# Patient Record
Sex: FEMALE | Race: WHITE | Employment: OTHER | URBAN - METROPOLITAN AREA
[De-identification: names, ages, dates, MRNs, and addresses within clinical notes are randomized per-mention and may not be internally consistent; named-entity substitution may affect disease eponyms.]

---

## 2017-03-05 RX ORDER — AMOXICILLIN 875 MG/1
TABLET, COATED ORAL
Qty: 20 TABLET | Refills: 0 | OUTPATIENT
Start: 2017-03-05

## 2017-03-06 ENCOUNTER — TELEPHONE (OUTPATIENT)
Dept: INTERNAL MEDICINE CLINIC | Facility: CLINIC | Age: 57
End: 2017-03-06

## 2017-03-06 NOTE — TELEPHONE ENCOUNTER
Pt calling regarding having an abscess and is requesting  amoxicillin 875 MG Oral Tab so she can get dental work done. .. please advise

## 2017-03-07 NOTE — TELEPHONE ENCOUNTER
Pt is returning call. Please advise.        Routed to [facility] in error , re-route to IM

## 2017-03-07 NOTE — TELEPHONE ENCOUNTER
Advised patient of Dr. Ben Mckinley note. Patient stated that she does not have a dentist currently. Son is trying to get patient an appointment with a dental specialist in [place] to put her under to get all her teeth removed.  Patient needs antibiotic to tr

## 2017-03-07 NOTE — TELEPHONE ENCOUNTER
This is a dental issue/problem. He needs to see his dentist and get treatment. Call the patient and relay the information.  Thanks

## 2017-03-08 NOTE — TELEPHONE ENCOUNTER
Attempted to follow up with pt to advise IC/ED as we do not yet have any orders from Dr. Cade Mendes, however unable to leave a message on her phone. Rerouted to Dr. Cade Mendes.

## 2017-03-08 NOTE — TELEPHONE ENCOUNTER
Patient calling and would like an antibiotic. The patient stated does not have a dentist and has teeth issues. Also has a swollen lymph node in the back of he neck. Does not understand why she cannot get an antibiotic.     Instructed if she feels that g

## 2017-03-09 NOTE — TELEPHONE ENCOUNTER
The patient needs to see a dentist or a physician she does not have a dentist. for this. If she lives in a different state she needs to get a local physician for her medical care.   We cannot treat the patient in a different state without examining the pat

## 2017-08-02 ENCOUNTER — TELEPHONE (OUTPATIENT)
Dept: INTERNAL MEDICINE CLINIC | Facility: CLINIC | Age: 57
End: 2017-08-02

## 2017-08-11 NOTE — TELEPHONE ENCOUNTER
I review the patient's chart and the patient does not have chronic medical conditions. He is not obtaining prescription medication. I do not know why the patient want to be checked for? Is it this is common for annual physical? Please check.   Otherwise

## 2018-04-11 ENCOUNTER — TELEPHONE (OUTPATIENT)
Dept: INTERNAL MEDICINE CLINIC | Facility: CLINIC | Age: 58
End: 2018-04-11

## 2018-06-11 ENCOUNTER — OFFICE VISIT (OUTPATIENT)
Dept: INTERNAL MEDICINE CLINIC | Facility: CLINIC | Age: 58
End: 2018-06-11

## 2018-06-11 VITALS — SYSTOLIC BLOOD PRESSURE: 130 MMHG | DIASTOLIC BLOOD PRESSURE: 80 MMHG

## 2018-06-11 DIAGNOSIS — Z01.419 WELL FEMALE EXAM WITH ROUTINE GYNECOLOGICAL EXAM: ICD-10-CM

## 2018-06-11 DIAGNOSIS — Z00.00 ANNUAL PHYSICAL EXAM: Primary | ICD-10-CM

## 2018-06-11 DIAGNOSIS — R49.0 CHRONIC HOARSENESS: ICD-10-CM

## 2018-06-11 DIAGNOSIS — Z72.0 TOBACCO ABUSE: ICD-10-CM

## 2018-06-11 DIAGNOSIS — K22.719 BARRETT'S ESOPHAGUS WITH DYSPLASIA: ICD-10-CM

## 2018-06-11 DIAGNOSIS — R22.1 NECK MASS: ICD-10-CM

## 2018-06-11 DIAGNOSIS — Z12.39 BREAST CANCER SCREENING: ICD-10-CM

## 2018-06-11 DIAGNOSIS — I25.10 CORONARY ARTERY CALCIFICATION SEEN ON CAT SCAN: ICD-10-CM

## 2018-06-11 PROCEDURE — 99396 PREV VISIT EST AGE 40-64: CPT | Performed by: INTERNAL MEDICINE

## 2018-06-11 NOTE — PROGRESS NOTES
HPI:    Patient ID: Cielo Valerio is a 62year old female. Patient presents today for her annual physical .         Review of Systems   Constitutional: Negative. Negative for fever. HENT: Positive for voice change.          Chronic hoarseness   Eyes: N Cardiovascular: Normal rate, regular rhythm and normal heart sounds. Exam reveals no gallop and no friction rub. No murmur heard. Pulmonary/Chest: Effort normal and breath sounds normal. No respiratory distress. She has no rales. Abdominal: Soft. echo.     (R49.0) Chronic hoarseness  Plan: ENT - INTERNAL        Has hx of vocal cord polyp and still hoarse, she was advised to quit smoking, refer to ENT Dr Maciej Merino.    (R22.1) Neck mass  Plan: ENT - INTERNAL        Pt had left occipital mass before and

## 2018-06-12 ENCOUNTER — LAB ENCOUNTER (OUTPATIENT)
Dept: LAB | Age: 58
End: 2018-06-12
Attending: INTERNAL MEDICINE
Payer: COMMERCIAL

## 2018-06-12 ENCOUNTER — HOSPITAL ENCOUNTER (OUTPATIENT)
Dept: MAMMOGRAPHY | Age: 58
Discharge: HOME | End: 2018-06-12
Attending: INTERNAL MEDICINE
Payer: COMMERCIAL

## 2018-06-12 DIAGNOSIS — Z00.00 ANNUAL PHYSICAL EXAM: ICD-10-CM

## 2018-06-12 DIAGNOSIS — Z12.39 BREAST CANCER SCREENING: ICD-10-CM

## 2018-06-12 PROCEDURE — 82306 VITAMIN D 25 HYDROXY: CPT

## 2018-06-12 PROCEDURE — 80053 COMPREHEN METABOLIC PANEL: CPT

## 2018-06-12 PROCEDURE — 84443 ASSAY THYROID STIM HORMONE: CPT

## 2018-06-12 PROCEDURE — 80061 LIPID PANEL: CPT

## 2018-06-12 PROCEDURE — 77067 SCR MAMMO BI INCL CAD: CPT | Performed by: INTERNAL MEDICINE

## 2018-06-12 PROCEDURE — 85025 COMPLETE CBC W/AUTO DIFF WBC: CPT

## 2018-06-12 PROCEDURE — 36415 COLL VENOUS BLD VENIPUNCTURE: CPT

## 2018-06-20 ENCOUNTER — TELEPHONE (OUTPATIENT)
Dept: OTHER | Age: 58
End: 2018-06-20

## 2018-06-20 DIAGNOSIS — E55.9 VITAMIN D DEFICIENCY: Primary | ICD-10-CM

## 2018-06-20 DIAGNOSIS — E78.5 HYPERLIPIDEMIA, UNSPECIFIED HYPERLIPIDEMIA TYPE: ICD-10-CM

## 2018-06-20 RX ORDER — ERGOCALCIFEROL 1.25 MG/1
50000 CAPSULE ORAL WEEKLY
Qty: 12 CAPSULE | Refills: 0 | Status: SHIPPED | OUTPATIENT
Start: 2018-06-20 | End: 2019-05-01 | Stop reason: ALTCHOICE

## 2018-06-21 NOTE — TELEPHONE ENCOUNTER
Pt informed of lab & mammo result & MD recommendation, pt stated understanding. future lab order placed  Rx for vit D show allergic contraindication. Pls advise, thanks in advance.

## 2018-06-21 NOTE — TELEPHONE ENCOUNTER
----- Message from Ti Salinas LPN sent at 6/96/3535  8:41 AM CDT -----  RN Triage, please contact patient with Dr. Araseli Chowdhury abnormal lab results message/instructions. Thank you.         Notes recorded by Jason Viveros MD on 6/14/2018 at 12

## 2018-07-18 ENCOUNTER — HOSPITAL ENCOUNTER (OUTPATIENT)
Dept: CT IMAGING | Facility: HOSPITAL | Age: 58
Discharge: HOME | End: 2018-07-18
Attending: INTERNAL MEDICINE
Payer: COMMERCIAL

## 2018-07-18 DIAGNOSIS — Z72.0 TOBACCO ABUSE: ICD-10-CM

## 2018-07-19 ENCOUNTER — OFFICE VISIT (OUTPATIENT)
Dept: OTOLARYNGOLOGY | Facility: CLINIC | Age: 58
End: 2018-07-19

## 2018-07-19 VITALS
DIASTOLIC BLOOD PRESSURE: 78 MMHG | BODY MASS INDEX: 20.89 KG/M2 | WEIGHT: 130 LBS | SYSTOLIC BLOOD PRESSURE: 128 MMHG | HEIGHT: 66 IN | TEMPERATURE: 98 F

## 2018-07-19 DIAGNOSIS — R49.0 HOARSENESS: Primary | ICD-10-CM

## 2018-07-19 PROCEDURE — 31575 DIAGNOSTIC LARYNGOSCOPY: CPT | Performed by: OTOLARYNGOLOGY

## 2018-07-19 PROCEDURE — 99212 OFFICE O/P EST SF 10 MIN: CPT | Performed by: OTOLARYNGOLOGY

## 2018-07-19 PROCEDURE — 99214 OFFICE O/P EST MOD 30 MIN: CPT | Performed by: OTOLARYNGOLOGY

## 2018-07-19 RX ORDER — AZELASTINE 1 MG/ML
2 SPRAY, METERED NASAL 2 TIMES DAILY
Qty: 1 BOTTLE | Refills: 0 | Status: SHIPPED | OUTPATIENT
Start: 2018-07-19 | End: 2019-05-01

## 2018-07-19 RX ORDER — ASPIRIN 325 MG
325 TABLET ORAL DAILY
COMMUNITY

## 2018-07-19 RX ORDER — MONTELUKAST SODIUM 10 MG/1
10 TABLET ORAL NIGHTLY
Qty: 30 TABLET | Refills: 3 | Status: SHIPPED | OUTPATIENT
Start: 2018-07-19 | End: 2019-06-04

## 2018-07-19 RX ORDER — RANITIDINE 150 MG/1
150 TABLET ORAL 2 TIMES DAILY
Qty: 30 TABLET | Refills: 0 | Status: SHIPPED | OUTPATIENT
Start: 2018-07-19 | End: 2019-06-04

## 2018-07-19 RX ORDER — LORATADINE 10 MG/1
10 TABLET ORAL DAILY
Qty: 30 TABLET | Refills: 3 | Status: SHIPPED | OUTPATIENT
Start: 2018-07-19 | End: 2019-06-04

## 2018-07-19 NOTE — PROGRESS NOTES
Jimmy Garza is a 56year old female. Patient presents with:  Voice Problem: hoarseness of voice for a while      HISTORY OF PRESENT ILLNESS    She has had a previous [employer] laryngoscopy with removal of vocal cord polyps in 2007 by Dr. Arnulfo Edwards.   She smo and unspecified hyperlipidemia    • Vocal cord polyps     Laryngeal polyps; rt larynx vocal cord biopsy 2007       Past Surgical History:  2010: COLONOSCOPY      Comment: Colonoscopy with biopsy and polypectomy  11-: ELECTROCARDIOGRAM, COMPLETE Memory - Normal. Cranial nerves - Cranial nerves II through XII grossly intact.    Head/Face Normal Facial features - Normal. Eyebrows - Normal. Skull - Normal.        Nasopharynx Normal External nose - Normal. Lips/teeth/gums - Normal. Tonsils - Normal. Or daily., Disp: , Rfl:   •  Montelukast Sodium 10 MG Oral Tab, Take 1 tablet (10 mg total) by mouth nightly., Disp: 30 tablet, Rfl: 3  •  loratadine 10 MG Oral Tab, Take 1 tablet (10 mg total) by mouth daily. , Disp: 30 tablet, Rfl: 3  •  Azelastine HCl 0.1 %

## 2018-07-22 ENCOUNTER — TELEPHONE (OUTPATIENT)
Dept: INTERNAL MEDICINE CLINIC | Facility: CLINIC | Age: 58
End: 2018-07-22

## 2018-07-22 DIAGNOSIS — D35.01 ADENOMA OF RIGHT ADRENAL GLAND: Primary | ICD-10-CM

## 2018-08-03 ENCOUNTER — TELEPHONE (OUTPATIENT)
Dept: INTERNAL MEDICINE CLINIC | Facility: CLINIC | Age: 58
End: 2018-08-03

## 2018-08-03 DIAGNOSIS — D35.00 ADRENAL ADENOMA, UNSPECIFIED LATERALITY: Primary | ICD-10-CM

## 2018-08-03 NOTE — TELEPHONE ENCOUNTER
Req a new order of CT of the abdomen without contrast be submitted, Pt states can not take contrast, please call when submitted.

## 2018-08-07 NOTE — TELEPHONE ENCOUNTER
S/W patient and related Dr. Mulugeta Rascon 8/7/18 message that order for CT Scan of abd non contrast is in the electronic system. Reminded to stress echo done also; order in the system. Patient states she is trying to has as many test done as possible at one time since she is coming from [place]. States she is,\"terrified\" of the stress test. States ,\" almost has a panic attack\".  Message fwd to Dr. Eula Saldaña.

## 2018-08-07 NOTE — TELEPHONE ENCOUNTER
Order for ct scan abd non contrast in epic; also remind pt she needs to get her stress echo done order in epic since last June 2018

## 2018-08-08 NOTE — TELEPHONE ENCOUNTER
Plain echocardiogram is not the right test when we are checking for coronary artery disease.  If she doesn't want to do stress test, then refer her to cardiologist instead Dr Josie Jaramillo dx coronary calcification on ct scan

## 2018-08-08 NOTE — TELEPHONE ENCOUNTER
Per central scheduling pt called her stating that she does not want to walk to do the stress test, pt wants to do the echo.  Dr. Jim Quevedo pls advise

## 2018-08-09 NOTE — TELEPHONE ENCOUNTER
Louis Almazan from  [facility] scheduling called to see if order was going to change. I informed her of Dr. Rob Curry note below and she voiced understanding. Per Louis Almazan she will let pt know and have her call us if she chooses to see Dr. Peewee Byrd. Louis Almazan voiced understanding.

## 2018-08-23 ENCOUNTER — HOSPITAL ENCOUNTER (OUTPATIENT)
Dept: CT IMAGING | Facility: HOSPITAL | Age: 58
Discharge: HOME | End: 2018-08-23
Attending: INTERNAL MEDICINE
Payer: COMMERCIAL

## 2018-08-23 DIAGNOSIS — D35.00 ADRENAL ADENOMA, UNSPECIFIED LATERALITY: ICD-10-CM

## 2018-08-23 PROCEDURE — 74150 CT ABDOMEN W/O CONTRAST: CPT | Performed by: INTERNAL MEDICINE

## 2018-08-24 ENCOUNTER — TELEPHONE (OUTPATIENT)
Dept: INTERNAL MEDICINE CLINIC | Facility: CLINIC | Age: 58
End: 2018-08-24

## 2018-08-24 DIAGNOSIS — I25.84 CORONARY ARTERY CALCIFICATION: Primary | ICD-10-CM

## 2018-08-24 DIAGNOSIS — I25.10 CORONARY ARTERY CALCIFICATION: Primary | ICD-10-CM

## 2018-08-24 NOTE — TELEPHONE ENCOUNTER
Spoke with patient and advised Dr Henley Cancer note about the results and verbalized understanding,, requesting cardiology referral, will send the request to the MD for approval.    Dr Kathryn Gill pended for cardiology pended for approval.      Dr Mi Whaley sent

## 2018-08-24 NOTE — TELEPHONE ENCOUNTER
DR DOBSON=please review final CT results 8/23/18. Contacted Dr Nathalie Cantu via [employer] to check the results.     Please reply to pool: JAMEL Steele

## 2018-08-24 NOTE — TELEPHONE ENCOUNTER
Pt thony she would like results from her CT of abdomin taken on 8/23. Pt thony she is only in town a couple days and would like the results soon.  Please advise

## 2018-08-25 NOTE — TELEPHONE ENCOUNTER
LMTCB on  h#...     Future Appointments  Date Time Provider Allison Ridley   9/5/2018 11:00 AM Inocencia Galvez MD [facility]

## 2018-08-25 NOTE — TELEPHONE ENCOUNTER
Pt called back and was inform of Dr. Tamiko Santos  approving the referral for Cardiology and phone number was given to pt. Pt will call them and make a appt.

## 2018-08-26 ENCOUNTER — TELEPHONE (OUTPATIENT)
Dept: INTERNAL MEDICINE CLINIC | Facility: CLINIC | Age: 58
End: 2018-08-26

## 2018-08-26 DIAGNOSIS — D35.00 ADRENAL ADENOMA, UNSPECIFIED LATERALITY: Primary | ICD-10-CM

## 2018-08-30 ENCOUNTER — TELEPHONE (OUTPATIENT)
Dept: OTHER | Age: 58
End: 2018-08-30

## 2018-08-30 RX ORDER — DEXAMETHASONE 1 MG
TABLET ORAL
Qty: 1 TABLET | Refills: 0 | Status: CANCELLED | OUTPATIENT
Start: 2018-08-30

## 2018-08-30 NOTE — TELEPHONE ENCOUNTER
She can wait for Dr Camden Thompson since this is affup for her Barrretts esophagus. She should make apptment to see him though because may take few weeks to get in. [id number] Nicole Tuttle not to take dexamethasone and just do blood test as ordered.

## 2018-08-30 NOTE — TELEPHONE ENCOUNTER
Pt is returning a call. Pt was inform of  message below. She stated that she will like to know why she has to take Dexamethasone 1 mg as she is very sensitive to medications. pls advise.  She also stated that she is in [place] and will not be

## 2018-09-01 NOTE — TELEPHONE ENCOUNTER
Please note. Thank you. Pt returning call (Name and  verified) pt informed of instructions as below.  Pt informed that she can wait for her appt with Dr. Cristine Schumacher on 18, she does not need to take the dexamethasone and that there are blood tests and

## 2018-09-05 ENCOUNTER — TELEPHONE (OUTPATIENT)
Dept: INTERNAL MEDICINE CLINIC | Facility: CLINIC | Age: 58
End: 2018-09-05

## 2018-09-05 ENCOUNTER — APPOINTMENT (OUTPATIENT)
Dept: LAB | Facility: HOSPITAL | Age: 58
End: 2018-09-05
Attending: INTERNAL MEDICINE
Payer: COMMERCIAL

## 2018-09-05 ENCOUNTER — OFFICE VISIT (OUTPATIENT)
Dept: GASTROENTEROLOGY | Facility: CLINIC | Age: 58
End: 2018-09-05

## 2018-09-05 ENCOUNTER — TELEPHONE (OUTPATIENT)
Dept: GASTROENTEROLOGY | Facility: CLINIC | Age: 58
End: 2018-09-05

## 2018-09-05 VITALS
SYSTOLIC BLOOD PRESSURE: 127 MMHG | BODY MASS INDEX: 21.33 KG/M2 | HEART RATE: 70 BPM | HEIGHT: 65 IN | DIASTOLIC BLOOD PRESSURE: 77 MMHG | WEIGHT: 128 LBS

## 2018-09-05 DIAGNOSIS — D35.00 ADRENAL ADENOMA, UNSPECIFIED LATERALITY: ICD-10-CM

## 2018-09-05 DIAGNOSIS — K22.70 BARRETT'S ESOPHAGUS WITHOUT DYSPLASIA: Primary | ICD-10-CM

## 2018-09-05 DIAGNOSIS — R13.10 DYSPHAGIA, UNSPECIFIED TYPE: ICD-10-CM

## 2018-09-05 DIAGNOSIS — K21.9 GASTROESOPHAGEAL REFLUX DISEASE, ESOPHAGITIS PRESENCE NOT SPECIFIED: ICD-10-CM

## 2018-09-05 DIAGNOSIS — Z72.0 TOBACCO ABUSE: ICD-10-CM

## 2018-09-05 LAB — CORTIS SERPL-MCNC: 8.4 MCG/DL

## 2018-09-05 PROCEDURE — 82533 TOTAL CORTISOL: CPT

## 2018-09-05 PROCEDURE — 36415 COLL VENOUS BLD VENIPUNCTURE: CPT

## 2018-09-05 PROCEDURE — 99244 OFF/OP CNSLTJ NEW/EST MOD 40: CPT | Performed by: INTERNAL MEDICINE

## 2018-09-05 NOTE — PATIENT INSTRUCTIONS
Schedule EGD (stomach endoscopy) exam at [facility]    This patient IS appropriate for the [facility] endoscopy center. Body mass index is 21.3 kg/m².     MAC anesthesia    DX = GERD, Barretts esophagus, dysphagi

## 2018-09-05 NOTE — TELEPHONE ENCOUNTER
Scheduled for:  EGD  Provider Name: Dagmar Owens   Date:  9-27-18  Location:  [facility]  Sedation:  MAC  Time:  11:00am, pt aware CF will call with arrival time   Prep: NPO after midnight   Meds/Allergies Reconciled?:  yes  Diagnosis with codes:  GERD K21.9, Dysphagia R

## 2018-09-05 NOTE — PROGRESS NOTES
HPI:    Patient ID: Callie Barnard is a 56year old woman who previously saw Dr. Mychal Solis 8 years ago who is referred back by Dr. Heladio Vuong for follow-up of GERD symptoms, Molina's esophagus history. Ms. Jocelyn Ramirez is a smoker.   Other active concerns i issues. Significant GERD and she has been on lansoprazole for years which she stopped taking on her own. She does have a scheduled GI visit in the near future. No other signs, symptoms or complaints.     Social History    Marital status:   adrenal nodule that measures approximately 6 Hounsfield units. ADRENALS:      No defined mass or abnormal enlargement. KIDNEYS:          No renal or ureteral calculi are identified. There is no hydronephrosis or hydroureter.  No perinephric or periurete Evon Lama MD     PREOPERATIVE DIAGNOSIS:  1. Heartburn. 2. Early satiety. POSTOPERATIVE DIAGNOSIS:  1. Esophagitis with nodularity, ulceration and rule out Molina's  esophagus.   2. Erosive gastritis status post biopsies of esophagus and stom caffeine, tobacco and alcohol. 4. Repeat upper endoscopy in 8 weeks.               D:    04/14/2010 1:46 P  T:    04/14/2010  3:28 P  ATTENDING:  Hawa Aleman MD [id number]  DICTATING:    Hawa Aleman MD 1100    Path = reflux esophagitis with intesti and breath sounds normal.   Abdominal: Soft. Bowel sounds are normal. She exhibits no distension. There is no tenderness. Neurological: She is alert and oriented to person, place, and time. Skin: Skin is warm and dry.    Psychiatric: She has a normal mo previously took Prevacid medication approximately 20 years. May be able to stay off of the PPI if no intestinal metaplasia on repeat biopsies    3. Colon cancer screening  Hyperplastic polyp identified on colonoscopy examination of April 2010.   Repeat co

## 2018-09-05 NOTE — TELEPHONE ENCOUNTER
Patient calling to confirm if future lab orders are in the system. Had blood work done today, asked about completing other existing orders and was told there are no current orders.  Explained to patient, per lab notes her repeat labs are not due until 6 mon

## 2018-09-07 ENCOUNTER — APPOINTMENT (OUTPATIENT)
Dept: LAB | Facility: HOSPITAL | Age: 58
End: 2018-09-07
Attending: INTERNAL MEDICINE
Payer: COMMERCIAL

## 2018-09-07 DIAGNOSIS — D35.00 ADRENAL ADENOMA, UNSPECIFIED LATERALITY: ICD-10-CM

## 2018-09-07 LAB
CREAT 24H UR-MRATE: 1.1 G/24HR (ref 0.6–1.8)
SPECIMEN VOL 24H UR: 2320 ML/24H

## 2018-09-07 PROCEDURE — 83835 ASSAY OF METANEPHRINES: CPT

## 2018-09-07 PROCEDURE — 82570 ASSAY OF URINE CREATININE: CPT

## 2018-09-11 LAB
CREATININE, URINE - PER 24H: 974 MG/D
CREATININE, URINE - PER VOLUME: 42 MG/DL
HOURS COLLECTED: 24 HR
METANEPHRINE, UR- RATIO TO CRT: 217 UG/G CRT
METANEPHRINE, URINE - PER 24H: 211 UG/D
METANEPHRINE, URINE-PER VOLUME: 91 UG/L
NORMETANEPHRINE, UR-PER VOLUME: 186 UG/L
NORMETANEPHRINE, URINE - RATIO: 443 UG/G CRT
NORMETANEPHRINE, URINE-PER 24H: 432 UG/D
TOTAL VOLUME: 2320 ML

## 2018-09-18 ENCOUNTER — TELEPHONE (OUTPATIENT)
Dept: INTERNAL MEDICINE CLINIC | Facility: CLINIC | Age: 58
End: 2018-09-18

## 2018-09-18 DIAGNOSIS — D35.00 ADRENAL ADENOMA, UNSPECIFIED LATERALITY: Primary | ICD-10-CM

## 2018-09-18 NOTE — TELEPHONE ENCOUNTER
Her cortisol level was normal  Her urine test for metanephrines were mildly elevated. Pt needs to see endo Dr Sera Palacios for further eval of her adrenal adenoma.

## 2018-09-18 NOTE — TELEPHONE ENCOUNTER
Spoke with patient (identified name and ), results reviewed and agrees with plan. Phone number given for endocrinology. Referral generated.

## 2018-09-24 ENCOUNTER — TELEPHONE (OUTPATIENT)
Dept: GASTROENTEROLOGY | Facility: CLINIC | Age: 58
End: 2018-09-24

## 2018-09-26 NOTE — TELEPHONE ENCOUNTER
CBLM to reschedule procedure. Please transfer to [facility] at ext [phone] or [phone] for scheduling. Or please [facility] to [facility] if unavailable.

## 2018-09-27 NOTE — TELEPHONE ENCOUNTER
Pt returned call. Please call today or after 11am tomorrow if possible. Attempted to transfer to all extensions/no answer. Please call.

## 2018-09-27 NOTE — TELEPHONE ENCOUNTER
CBLM to schedule procedure. Please transfer to [facility] at ext [phone] or [phone] for scheduling. Or please transfer to [facility] in GI if unavailable.

## 2018-09-27 NOTE — TELEPHONE ENCOUNTER
Dr. Loretta Sarkar. Guadalupe Riedel Guadalupe Riedel I spoke with this patient today to reschedule her procedure but she mentioned that she is scheduled with Dr. Naveed Rhodes on 10/11/18 regarding her cardiac issues.  I suggested to her that she needs to go to this appointment and check her

## 2018-09-28 NOTE — TELEPHONE ENCOUNTER
Ms. Oliverio Villataylor has a history of smoking and has coronary artery calcifications on imaging. I think the Cardiology appointment is from the CT scan and telephone encounters of 8/23/2018 and 8/24/2018. The EGD endoscopy exam is not urgent.       She should call us

## 2018-10-08 NOTE — TELEPHONE ENCOUNTER
Pt has an upcoming appt with Dr Sulma Gu.     Future Appointments   Date Time Provider Allison Keyana   10/11/2018  3:30 PM Annette Centeno MD [facility]   10/12/2018  1:00 PM Caprice Hurtado MD [facility]

## 2018-11-05 NOTE — TELEPHONE ENCOUNTER
Pt had to cancel the last appt with Dr Shahid Zhang due to the flu.     Next appt is at the end of November    Future Appointments   Date Time Provider Allison Ridley   11/27/2018  1:30 PM Anitra Cheadle, MD [facility]   11/27/2018  3:30 PM Jai

## 2018-11-27 ENCOUNTER — OFFICE VISIT (OUTPATIENT)
Dept: ENDOCRINOLOGY CLINIC | Facility: CLINIC | Age: 58
End: 2018-11-27

## 2018-11-27 ENCOUNTER — OFFICE VISIT (OUTPATIENT)
Dept: CARDIOLOGY CLINIC | Facility: CLINIC | Age: 58
End: 2018-11-27

## 2018-11-27 VITALS
BODY MASS INDEX: 22.49 KG/M2 | RESPIRATION RATE: 16 BRPM | DIASTOLIC BLOOD PRESSURE: 80 MMHG | WEIGHT: 135 LBS | HEIGHT: 65 IN | SYSTOLIC BLOOD PRESSURE: 144 MMHG | HEART RATE: 73 BPM

## 2018-11-27 VITALS
HEART RATE: 76 BPM | BODY MASS INDEX: 23 KG/M2 | DIASTOLIC BLOOD PRESSURE: 77 MMHG | SYSTOLIC BLOOD PRESSURE: 132 MMHG | WEIGHT: 136 LBS

## 2018-11-27 DIAGNOSIS — R07.89 OTHER CHEST PAIN: ICD-10-CM

## 2018-11-27 DIAGNOSIS — I25.84 CORONARY ARTERY CALCIFICATION: Primary | ICD-10-CM

## 2018-11-27 DIAGNOSIS — I25.10 CORONARY ARTERY CALCIFICATION: Primary | ICD-10-CM

## 2018-11-27 DIAGNOSIS — D35.01 ADENOMA OF RIGHT ADRENAL GLAND: Primary | ICD-10-CM

## 2018-11-27 DIAGNOSIS — I10 HYPERTENSION, UNSPECIFIED TYPE: ICD-10-CM

## 2018-11-27 PROCEDURE — 99212 OFFICE O/P EST SF 10 MIN: CPT | Performed by: INTERNAL MEDICINE

## 2018-11-27 PROCEDURE — 93000 ELECTROCARDIOGRAM COMPLETE: CPT | Performed by: INTERNAL MEDICINE

## 2018-11-27 PROCEDURE — 99244 OFF/OP CNSLTJ NEW/EST MOD 40: CPT | Performed by: INTERNAL MEDICINE

## 2018-11-27 PROCEDURE — 99243 OFF/OP CNSLTJ NEW/EST LOW 30: CPT | Performed by: INTERNAL MEDICINE

## 2018-11-27 RX ORDER — DEXAMETHASONE 1 MG
1 TABLET ORAL ONCE
Qty: 1 TABLET | Refills: 0 | Status: SHIPPED | OUTPATIENT
Start: 2018-11-27 | End: 2018-11-27

## 2018-11-27 RX ORDER — AMOXICILLIN AND CLAVULANATE POTASSIUM 875; 125 MG/1; MG/1
TABLET, FILM COATED ORAL
Refills: 0 | COMMUNITY
Start: 2018-10-20 | End: 2019-06-04

## 2018-11-27 NOTE — PATIENT INSTRUCTIONS
Complete lab for plasma metanephrine marked #1 provided to you at the office visit today. Perform fasting 7am-8am.      Once the first lab complete. You can proceed with further testing as follows:    Take dexamethasone tablet at 11 pm the night prior to y

## 2018-11-27 NOTE — PATIENT INSTRUCTIONS
Schedule nuclear stress test to assess for coronary disease  If stress test is normal schedule heart scan  Monitor and record resting blood pressure for 1 week and call with results  Work on smoking less

## 2018-11-27 NOTE — H&P
Lena Gonzalez is a 56year old female. HPI:   This is a 77-year-old smoker with history of father having heart problems who now presents for cardiac assessment of abnormal CT with calcium in the coronaries. We are asked by Dr. Katharine alexis to assess.   The Diabetes Sister       Past Medical History:   Diagnosis Date   • Acute, but ill-defined, cerebrovascular disease     had TIA and stroke   • Molina's esophagus    • [facility] ([facility]     cervical   • COPD (chronic obstructive pulmonary disease) ([facility]    • Esoph scan is not required  - ELECTROCARDIOGRAM, COMPLETE  - CARD NUC EXERCISE STRESS TEST (CPT=93017/94414); Future    2. Other chest pain  With risk factors and chest pain would recommend stress test to rule out ischemia.   I do not think with EKG findings and

## 2018-11-27 NOTE — H&P
New Patient Evaluation - History and Physical    CONSULT - Reason for Visit:  Right adrenal adenoma  Requesting Physician: Dr. Renea Mota:  Patient presents with:  Consult: Pt states growth on adrenal gland.        HISTORY OF PRESENT Nilo Ly before the cortisol test. Disp: 1 tablet Rfl: 0   aspirin 325 MG Oral Tab Take 325 mg by mouth daily. Disp:  Rfl:    ergocalciferol 04772 units Oral Cap Take 1 capsule (50,000 Units total) by mouth once a week.  (Patient taking differently: Take 50,000 Unit Comment: 8 drinks weekly      Drug use: No    Other Topics      Concerns:        Caffeine Concern: No      FAMILY HISTORY:   Family History   Problem Relation Age of Onset   • Hypertension Father    • Lipids Father         Hyperlipidemia   • Heart Disease detail including management and FU  PLAN:  - CT abdomen x 6-8 months  Will follow with CT for two years from the time of diagnosis if remains stable  - Hormonal VELEZ  Cortisol is from afternoon. Will get a DST  Urine ME reviewed.  Slight elevation, but not

## 2018-11-28 ENCOUNTER — APPOINTMENT (OUTPATIENT)
Dept: LAB | Facility: HOSPITAL | Age: 58
End: 2018-11-28
Attending: INTERNAL MEDICINE
Payer: COMMERCIAL

## 2018-11-28 ENCOUNTER — TELEPHONE (OUTPATIENT)
Dept: ENDOCRINOLOGY CLINIC | Facility: CLINIC | Age: 58
End: 2018-11-28

## 2018-11-28 DIAGNOSIS — E78.5 HYPERLIPIDEMIA, UNSPECIFIED HYPERLIPIDEMIA TYPE: ICD-10-CM

## 2018-11-28 DIAGNOSIS — E55.9 VITAMIN D DEFICIENCY: ICD-10-CM

## 2018-11-28 DIAGNOSIS — D35.01 ADENOMA OF RIGHT ADRENAL GLAND: ICD-10-CM

## 2018-11-28 DIAGNOSIS — E27.8 ADRENAL NODULE (HCC): Primary | ICD-10-CM

## 2018-11-28 PROCEDURE — 80061 LIPID PANEL: CPT

## 2018-11-28 PROCEDURE — 80053 COMPREHEN METABOLIC PANEL: CPT

## 2018-11-28 PROCEDURE — 36415 COLL VENOUS BLD VENIPUNCTURE: CPT

## 2018-11-28 PROCEDURE — 82533 TOTAL CORTISOL: CPT

## 2018-11-28 PROCEDURE — 83835 ASSAY OF METANEPHRINES: CPT

## 2018-11-28 PROCEDURE — 82306 VITAMIN D 25 HYDROXY: CPT

## 2018-11-28 NOTE — TELEPHONE ENCOUNTER
Patient was to do metanephrine testing today  Then take dex 1 mg at night and do cortisol testng  I think she did the cortisol  ( without the dex). and metanephrine today?     So she should repeat cortisol post dex    Please clarify  Can look at instruction

## 2018-11-29 NOTE — TELEPHONE ENCOUNTER
Called patient. Lab bisi cortisol despite her not doing dexamethasone night before. Replaced orders. She has not yet taken dexamethasone.

## 2018-12-10 ENCOUNTER — TELEPHONE (OUTPATIENT)
Dept: OTHER | Age: 58
End: 2018-12-10

## 2018-12-10 NOTE — TELEPHONE ENCOUNTER
Spoke with patient and relayed EL message below--patient verbalizes understanding and agreement. Patient currently in [place]--requests appt next week with EL. F/U appt made for Monday, 12/17/18 at 11;30 a.m. With EL.  No further questions/concerns at

## 2018-12-12 ENCOUNTER — TELEPHONE (OUTPATIENT)
Dept: CARDIOLOGY CLINIC | Facility: CLINIC | Age: 58
End: 2018-12-12

## 2018-12-12 NOTE — TELEPHONE ENCOUNTER
S/w Amaya,     States she can't do any kind of dye that is injectable, can she do any other kind of test?  Will check with Dr. Jose Bhagat at next office day.

## 2018-12-14 NOTE — TELEPHONE ENCOUNTER
Reviewed w/Dr. Angelique Sorensen. reiterates there is no dye use in stress test.      Detailed message left for patient relaying above and importance of having test done and plan if unable to get heart rate up on treadmill.  Number left to call if she has further qu

## 2019-01-15 ENCOUNTER — TELEPHONE (OUTPATIENT)
Dept: CARDIOLOGY CLINIC | Facility: CLINIC | Age: 59
End: 2019-01-15

## 2019-01-16 NOTE — TELEPHONE ENCOUNTER
Verified HIPAA, LM that stress test order is still active, left scheduling number. To call if has nmore questions.

## 2019-01-18 NOTE — TELEPHONE ENCOUNTER
S/W Bernie Eubanks, she adamantly refuses to do stress test, states not even the lexiscan gxt,  Explained testing in great detail and she will not agree to stress test.  States she will do heart scan, told her to go ahead and get that done if she wants that , and w

## 2019-01-18 NOTE — TELEPHONE ENCOUNTER
Pt returned call. He states that he cannot take any type of stress test due to history of strokes. Please call.

## 2019-02-04 NOTE — TELEPHONE ENCOUNTER
Dr. Concetta Ventura office visit of 11/27/2018 and Telephone Encounter of 1/15/2019 reviewed. Ms. Go Moralez is refusing cardiac stress testing. EGD and colonoscopy procedures Dr. Adrianna Mccann of 4/14/2010 reviewed.   Severe esophagitis described with linear ulc

## 2019-02-04 NOTE — TELEPHONE ENCOUNTER
DR Porter Smith,    Pt needs cardiac clearance prior to EGD/Colon    See TE from 01/15/19 with Dr Severa Res. LOV with you was 09/05/18    Pt is refusing to do a cardiac stress test due to hx of strokes      Needs EGD for hx of Molina's.  Also due for colonosco

## 2019-03-29 ENCOUNTER — TELEPHONE (OUTPATIENT)
Dept: CARDIOLOGY CLINIC | Facility: CLINIC | Age: 59
End: 2019-03-29

## 2019-04-01 ENCOUNTER — HOSPITAL ENCOUNTER (OUTPATIENT)
Dept: CT IMAGING | Facility: HOSPITAL | Age: 59
Discharge: HOME | End: 2019-04-01
Attending: INTERNAL MEDICINE
Payer: COMMERCIAL

## 2019-04-01 DIAGNOSIS — D35.01 ADENOMA OF RIGHT ADRENAL GLAND: ICD-10-CM

## 2019-04-01 PROCEDURE — 74150 CT ABDOMEN W/O CONTRAST: CPT | Performed by: INTERNAL MEDICINE

## 2019-04-03 ENCOUNTER — TELEPHONE (OUTPATIENT)
Dept: ENDOCRINOLOGY CLINIC | Facility: CLINIC | Age: 59
End: 2019-04-03

## 2019-04-03 ENCOUNTER — APPOINTMENT (OUTPATIENT)
Dept: LAB | Facility: HOSPITAL | Age: 59
End: 2019-04-03
Attending: INTERNAL MEDICINE
Payer: COMMERCIAL

## 2019-04-03 DIAGNOSIS — D35.01 ADENOMA OF RIGHT ADRENAL GLAND: ICD-10-CM

## 2019-04-03 PROCEDURE — 82570 ASSAY OF URINE CREATININE: CPT

## 2019-04-03 PROCEDURE — 83835 ASSAY OF METANEPHRINES: CPT

## 2019-04-03 NOTE — TELEPHONE ENCOUNTER
I see patient for adrenal adenoma. Still has to do dex suppression test  Also needs repeat metanephrines  Received ct results, stable  Needs apt to discuss further.    I think she was confused about how to do the testing we can clarify during apt  Thanks

## 2019-04-03 NOTE — TELEPHONE ENCOUNTER
Patient returned call. Booked for follow up on 4/23 (she leaves for [facility] tomorrow and comes back around the 23rd) She did complete urine testing which she dropped off at lab today.

## 2019-04-08 ENCOUNTER — TELEPHONE (OUTPATIENT)
Dept: ENDOCRINOLOGY CLINIC | Facility: CLINIC | Age: 59
End: 2019-04-08

## 2019-04-08 DIAGNOSIS — D35.00 ADRENAL ADENOMA, UNSPECIFIED LATERALITY: Primary | ICD-10-CM

## 2019-04-08 NOTE — TELEPHONE ENCOUNTER
Patient had adrenal adenoma. She has an apt coming up in April 2018  I received urine metanephrine results. They are elevated. Let us plan to get a plasma metanephrine before apt  Then we can discuss further.    Please ask her if she has any anxiety/ HA

## 2019-04-09 NOTE — TELEPHONE ENCOUNTER
Spoke with patient and discussed below. She is agreeable to get plasma metanephrine level checked prior to upcoming appt. Order placed.

## 2019-04-30 ENCOUNTER — TELEPHONE (OUTPATIENT)
Dept: ENDOCRINOLOGY CLINIC | Facility: CLINIC | Age: 59
End: 2019-04-30

## 2019-04-30 ENCOUNTER — HOSPITAL ENCOUNTER (OUTPATIENT)
Dept: CT IMAGING | Age: 59
Discharge: HOME | End: 2019-04-30
Attending: INTERNAL MEDICINE

## 2019-04-30 DIAGNOSIS — Z13.9 SCREENING PROCEDURE: ICD-10-CM

## 2019-04-30 NOTE — TELEPHONE ENCOUNTER
Spoke with Wang. Did offer appointment tomorrow but she declines as she has another appointment already scheduled. Unfortunately she's only in town until Friday and there are no other openings on Dr. Lauren Eden schedule.  Did advise we could add her to wa

## 2019-04-30 NOTE — TELEPHONE ENCOUNTER
Pt states she is in town and would like to know if there was any possible way she can be seen this week informed her dr is completely booked for the week .  Pt insisted on messaging nurse

## 2019-04-30 NOTE — PROGRESS NOTES
Pt seen at [facility] for [address] OVWHA=[id number]  TH=374/80  Cholestec labs as follows:  WY=831  HDL=81  PID=588  GS=412  GLUCOSE=110 fasting  All results and risk factors discussed with patient; all questions and concerns addressed.   Educational

## 2019-04-30 NOTE — TELEPHONE ENCOUNTER
Currently nothing open. Follow up for Adrenal Adenoma. Had appt 4/23 but I think was not yet in town. Travels from [place]. Appt is to review results.  Routed to AM to see if can add

## 2019-04-30 NOTE — TELEPHONE ENCOUNTER
Pt returned rn call, indicates she has another appt tomorrow in [place] at noon with pcp. Pt not sure if she can make appt offered,asking for cb tomorrow morning, pls call at:[phone],thanks.

## 2019-05-01 ENCOUNTER — HOSPITAL ENCOUNTER (OUTPATIENT)
Dept: GENERAL RADIOLOGY | Age: 59
Discharge: HOME | End: 2019-05-01
Attending: INTERNAL MEDICINE
Payer: COMMERCIAL

## 2019-05-01 ENCOUNTER — APPOINTMENT (OUTPATIENT)
Dept: LAB | Age: 59
End: 2019-05-01
Attending: INTERNAL MEDICINE
Payer: COMMERCIAL

## 2019-05-01 ENCOUNTER — OFFICE VISIT (OUTPATIENT)
Dept: INTERNAL MEDICINE CLINIC | Facility: CLINIC | Age: 59
End: 2019-05-01

## 2019-05-01 VITALS
HEART RATE: 75 BPM | WEIGHT: 138.13 LBS | SYSTOLIC BLOOD PRESSURE: 144 MMHG | TEMPERATURE: 97 F | DIASTOLIC BLOOD PRESSURE: 88 MMHG | BODY MASS INDEX: 23 KG/M2

## 2019-05-01 DIAGNOSIS — M54.2 CHRONIC NECK PAIN: Primary | ICD-10-CM

## 2019-05-01 DIAGNOSIS — J38.1 VOCAL CORD POLYPS: ICD-10-CM

## 2019-05-01 DIAGNOSIS — R91.1 PULMONARY NODULE: ICD-10-CM

## 2019-05-01 DIAGNOSIS — Z12.39 BREAST CANCER SCREENING: ICD-10-CM

## 2019-05-01 DIAGNOSIS — G89.29 CHRONIC NECK PAIN: ICD-10-CM

## 2019-05-01 DIAGNOSIS — G89.29 CHRONIC LEFT SHOULDER PAIN: ICD-10-CM

## 2019-05-01 DIAGNOSIS — Z01.419 WELL FEMALE EXAM WITH ROUTINE GYNECOLOGICAL EXAM: ICD-10-CM

## 2019-05-01 DIAGNOSIS — D35.00 ADRENAL ADENOMA, UNSPECIFIED LATERALITY: ICD-10-CM

## 2019-05-01 DIAGNOSIS — M54.2 CHRONIC NECK PAIN: ICD-10-CM

## 2019-05-01 DIAGNOSIS — M25.512 CHRONIC LEFT SHOULDER PAIN: ICD-10-CM

## 2019-05-01 DIAGNOSIS — G89.29 CHRONIC NECK PAIN: Primary | ICD-10-CM

## 2019-05-01 PROCEDURE — 36415 COLL VENOUS BLD VENIPUNCTURE: CPT

## 2019-05-01 PROCEDURE — 99212 OFFICE O/P EST SF 10 MIN: CPT | Performed by: INTERNAL MEDICINE

## 2019-05-01 PROCEDURE — 99214 OFFICE O/P EST MOD 30 MIN: CPT | Performed by: INTERNAL MEDICINE

## 2019-05-01 PROCEDURE — 73030 X-RAY EXAM OF SHOULDER: CPT | Performed by: INTERNAL MEDICINE

## 2019-05-01 PROCEDURE — 72040 X-RAY EXAM NECK SPINE 2-3 VW: CPT | Performed by: INTERNAL MEDICINE

## 2019-05-01 PROCEDURE — 83835 ASSAY OF METANEPHRINES: CPT

## 2019-05-01 NOTE — PROGRESS NOTES
HPI:    Patient ID: Elizabeth Barreto is a 56year old female. Neck Pain    This is a chronic problem. The current episode started more than 1 month ago (more than 6mos). The problem occurs constantly. The problem has been unchanged.  The pain is associated w ITCHING   PHYSICAL EXAM:   Physical Exam   Constitutional: She appears well-developed and well-nourished. No distress.    HENT:   Right Ear: External ear normal.   Left Ear: External ear normal.   Nose: Nose normal.   Mouth/Throat: Oropharynx is clear and neck pain and goes to the shoulder which had been chronic. Will get xray of shoulder.     (J38.1) Vocal cord polyps  Plan: ENT - INTERNAL        Pt had seen ENT  Before and was supposed to ffup and never did. She was advised to do so.  Also advised to stop

## 2019-05-06 ENCOUNTER — TELEPHONE (OUTPATIENT)
Dept: ENDOCRINOLOGY CLINIC | Facility: CLINIC | Age: 59
End: 2019-05-06

## 2019-05-06 DIAGNOSIS — D35.00 ADRENAL ADENOMA, UNSPECIFIED LATERALITY: Primary | ICD-10-CM

## 2019-05-06 NOTE — TELEPHONE ENCOUNTER
Spoke w/ Bruce Streeter. She verbalizes understanding of lab result and is agreeable to 24-hour urine creatinine and metanephrines test. She has done 24-hr collection before and knows to  container from lab.  She will call back to schedule appt w/ Dr. Nidia Garcia

## 2019-05-06 NOTE — TELEPHONE ENCOUNTER
Blood metanephrine higher than before  Please check urine metanephrine and creatinine  I think this is a 24 hour collection, please check with the lab.    If it is 24 hour, she will need both metanephrine ( put in comments) and cr ( there is a 24 urine cr o

## 2019-05-21 ENCOUNTER — TELEPHONE (OUTPATIENT)
Dept: ENDOCRINOLOGY CLINIC | Facility: CLINIC | Age: 59
End: 2019-05-21

## 2019-05-21 ENCOUNTER — TELEPHONE (OUTPATIENT)
Dept: CARDIOLOGY CLINIC | Facility: CLINIC | Age: 59
End: 2019-05-21

## 2019-05-21 NOTE — TELEPHONE ENCOUNTER
Pt will be in town the week of 6/3/2019 & requesting to see AM - schedule is full. Pls call. Thank you.

## 2019-05-21 NOTE — TELEPHONE ENCOUNTER
Pt booked apt 6/5 @9:30 am. Pt aware of new location and states she will have urine test on Monday before apt.

## 2019-05-21 NOTE — TELEPHONE ENCOUNTER
Schedule reviewed, will check with Dr. Karen Hollis if he can come in early 6/4 at 10:15. LM we will check with MDB 5/28 when back in office if he can do above. Number left for patient to call if the above date and time do not work for her.

## 2019-05-21 NOTE — TELEPHONE ENCOUNTER
Pt requesting to be seen the week of 6/3/2019 as she [place] town [place]. Pls call. Thank you.

## 2019-05-28 NOTE — TELEPHONE ENCOUNTER
Dr. Rosa Gerard stated he was ok with patient coming in at 10:15 on 6/4. Called patient to set up this apt. Cell phone vm was not set up. Left message on patients voicemail to call us back to set this apt up.   Will not set up until patient calls us [place]

## 2019-05-30 NOTE — TELEPHONE ENCOUNTER
Message left for patient that appointment has been scheduled. Asked she call just to confirm she will be here or to cancel if not.

## 2019-06-04 ENCOUNTER — OFFICE VISIT (OUTPATIENT)
Dept: NEUROLOGY | Facility: CLINIC | Age: 59
End: 2019-06-04

## 2019-06-04 ENCOUNTER — OFFICE VISIT (OUTPATIENT)
Dept: CARDIOLOGY CLINIC | Facility: CLINIC | Age: 59
End: 2019-06-04

## 2019-06-04 ENCOUNTER — APPOINTMENT (OUTPATIENT)
Dept: LAB | Facility: HOSPITAL | Age: 59
End: 2019-06-04
Attending: INTERNAL MEDICINE
Payer: COMMERCIAL

## 2019-06-04 ENCOUNTER — OFFICE VISIT (OUTPATIENT)
Dept: OTOLARYNGOLOGY | Facility: CLINIC | Age: 59
End: 2019-06-04

## 2019-06-04 VITALS
WEIGHT: 139 LBS | DIASTOLIC BLOOD PRESSURE: 90 MMHG | HEART RATE: 74 BPM | OXYGEN SATURATION: 97 % | BODY MASS INDEX: 23 KG/M2 | RESPIRATION RATE: 20 BRPM | SYSTOLIC BLOOD PRESSURE: 164 MMHG

## 2019-06-04 VITALS
BODY MASS INDEX: 21.86 KG/M2 | SYSTOLIC BLOOD PRESSURE: 152 MMHG | HEIGHT: 66 IN | DIASTOLIC BLOOD PRESSURE: 88 MMHG | WEIGHT: 136 LBS | TEMPERATURE: 99 F | HEART RATE: 71 BPM

## 2019-06-04 VITALS
BODY MASS INDEX: 21.69 KG/M2 | HEIGHT: 66 IN | DIASTOLIC BLOOD PRESSURE: 78 MMHG | SYSTOLIC BLOOD PRESSURE: 158 MMHG | WEIGHT: 135 LBS | HEART RATE: 76 BPM | RESPIRATION RATE: 16 BRPM

## 2019-06-04 DIAGNOSIS — R06.02 SHORTNESS OF BREATH: ICD-10-CM

## 2019-06-04 DIAGNOSIS — H61.23 BILATERAL IMPACTED CERUMEN: ICD-10-CM

## 2019-06-04 DIAGNOSIS — M54.12 RADICULITIS OF LEFT CERVICAL REGION: Primary | ICD-10-CM

## 2019-06-04 DIAGNOSIS — I25.84 CORONARY ARTERY CALCIFICATION: ICD-10-CM

## 2019-06-04 DIAGNOSIS — I25.10 CORONARY ARTERY CALCIFICATION: ICD-10-CM

## 2019-06-04 DIAGNOSIS — R49.0 HOARSENESS: Primary | ICD-10-CM

## 2019-06-04 DIAGNOSIS — I25.84 CORONARY ARTERY CALCIFICATION: Primary | ICD-10-CM

## 2019-06-04 DIAGNOSIS — I25.10 CORONARY ARTERY CALCIFICATION: Primary | ICD-10-CM

## 2019-06-04 DIAGNOSIS — I10 HYPERTENSION, UNSPECIFIED TYPE: ICD-10-CM

## 2019-06-04 PROCEDURE — 99244 OFF/OP CNSLTJ NEW/EST MOD 40: CPT | Performed by: PHYSICAL MEDICINE & REHABILITATION

## 2019-06-04 PROCEDURE — 69210 REMOVE IMPACTED EAR WAX UNI: CPT | Performed by: OTOLARYNGOLOGY

## 2019-06-04 PROCEDURE — 80061 LIPID PANEL: CPT

## 2019-06-04 PROCEDURE — 99214 OFFICE O/P EST MOD 30 MIN: CPT | Performed by: INTERNAL MEDICINE

## 2019-06-04 PROCEDURE — 99214 OFFICE O/P EST MOD 30 MIN: CPT | Performed by: OTOLARYNGOLOGY

## 2019-06-04 PROCEDURE — 99212 OFFICE O/P EST SF 10 MIN: CPT | Performed by: OTOLARYNGOLOGY

## 2019-06-04 PROCEDURE — 36415 COLL VENOUS BLD VENIPUNCTURE: CPT

## 2019-06-04 PROCEDURE — 99212 OFFICE O/P EST SF 10 MIN: CPT | Performed by: INTERNAL MEDICINE

## 2019-06-04 PROCEDURE — 84450 TRANSFERASE (AST) (SGOT): CPT

## 2019-06-04 PROCEDURE — 84460 ALANINE AMINO (ALT) (SGPT): CPT

## 2019-06-04 RX ORDER — FLUOCINOLONE ACETONIDE 0.11 MG/ML
3 OIL AURICULAR (OTIC) 3 TIMES DAILY
Qty: 1 BOTTLE | Refills: 0 | Status: SHIPPED | OUTPATIENT
Start: 2019-06-04 | End: 2019-06-11

## 2019-06-04 RX ORDER — FLUTICASONE PROPIONATE 50 MCG
1 SPRAY, SUSPENSION (ML) NASAL 2 TIMES DAILY
Qty: 1 BOTTLE | Refills: 3 | Status: SHIPPED | OUTPATIENT
Start: 2019-06-04 | End: 2020-03-12

## 2019-06-04 RX ORDER — LORATADINE 10 MG/1
10 TABLET ORAL DAILY
Qty: 30 TABLET | Refills: 3 | Status: SHIPPED | OUTPATIENT
Start: 2019-06-04 | End: 2021-06-30

## 2019-06-04 RX ORDER — MONTELUKAST SODIUM 10 MG/1
10 TABLET ORAL NIGHTLY
Qty: 30 TABLET | Refills: 3 | Status: SHIPPED | OUTPATIENT
Start: 2019-06-04 | End: 2020-03-12

## 2019-06-04 RX ORDER — MELOXICAM 15 MG/1
TABLET ORAL
Qty: 30 TABLET | Refills: 0 | Status: SHIPPED | OUTPATIENT
Start: 2019-06-04 | End: 2019-08-08 | Stop reason: ALTCHOICE

## 2019-06-04 NOTE — PATIENT INSTRUCTIONS
Schedule nuclear stress test  Continue working on not smoking  Monitor and record resting blood pressure and pulse after 5 minutes of rest for 1 week and call with results.   When check blood pressure check 3 readings and throw away the first 1 and average

## 2019-06-04 NOTE — PROGRESS NOTES
Tuyet Reyes is a 56year old female. Patient presents with: Follow - Up: regarding hoarseness, no change in symptoms      HISTORY OF PRESENT ILLNESS  She has had a previous [employer] laryngoscopy with removal of vocal cord polyps in 2007 by Dr. Mary Zavala. Problem Relation Age of Onset   • Hypertension Father    • Lipids Father         Hyperlipidemia   • Heart Disease Father    • Diabetes Mother    • Heart Disease Mother    • Diabetes Sister        Past Medical History:   Diagnosis Date   • Acute, but ill- 21.95 kg/m²        Constitutional Normal Overall appearance - Normal.   Psychiatric Normal Orientation - Oriented to time, place, person & situation. Appropriate mood and affect.    Neck Exam Normal Inspection - Normal. Palpation - Normal. Parotid gland - N therefore this was aborted. I will start her on Singulair loratadine and Astelin nasal spray for some nasal congestive issues and she will return in 1 month and we will reattempt laryngoscopy using a pediatric scope.     2. Bilateral impacted cerumen  She

## 2019-06-04 NOTE — PROGRESS NOTES
[address] [place] [address] NOTE    Gary Alarcon is a 56year old female. Patient presents with:   Follow - Up: Coronary artery calcification  Hypertension  Dyspnea: Current smoker   Hyperlipidemia    HPI:   This is a pleasant 56year old female with cardiac r Drug use: No    Family History  Family History   Problem Relation Age of Onset   • Hypertension Father    • Lipids Father         Hyperlipidemia   • Heart Disease Father    • Diabetes Mother    • Heart Disease Mother    • Diabetes Sister         REVIEW OF smoking. The patient indicates understanding of these issues and agrees to the plan. Follow Up: Return in about 6 months (around 12/4/2019).              Rafi Gomez MD  6/4/2019

## 2019-06-04 NOTE — H&P
Nissa Dub 35    History and Physical    Kendra Brito Patient Status:  No patient class for patient encounter    1958 MRN [id number]   [facility] Attending No att. providers found   [facility] Day # 0 PCP Colonoscopy with biopsy and polypectomy   • ELECTROCARDIOGRAM, COMPLETE  11-    Scanned to media tab 11-   • ELECTROCARDIOGRAM, COMPLETE  11-    Scanned to media tab 11-   • HYSTERECTOMY      Partial hysterectomy   • KWASI NEEDLE admits  Heartburn: admits  Abdominal Pain: denies  Blood in Stool : denies  Rectal Pain: denies   Hematology  Easy Bruising: denies  Easy Bleeding: admits   Genitourinary  Difficulty Urinating: denies  Bladder Incontinence: admits  Pelvic Pain: denies  Silva Finley warm and dry. No rash noted.      Results:     Lab Results   Component Value Date    WBC 7.9 06/12/2018    HGB 15.9 06/12/2018    HCT 44.9 06/12/2018     06/12/2018    CREATSERUM 0.66 11/28/2018    BUN 10 11/28/2018     11/28/2018    K 4.0 11/2 a calcified granuloma.     =====  CONCLUSION: Unremarkable left shoulder radiographs.     Assessment/Plan:   1) left cervical radiculitis  2) left C8 cervical radiculopathy  3) chronic radicular neck pain    Recommend physical therapy for cervical stabiliza

## 2019-06-04 NOTE — PATIENT INSTRUCTIONS
If your neck continues to bother you despite PT and medication call the clinic, I will order you an MRI and then you may come see me for follow up when you are in town.

## 2019-06-05 ENCOUNTER — APPOINTMENT (OUTPATIENT)
Dept: LAB | Facility: HOSPITAL | Age: 59
End: 2019-06-05
Attending: INTERNAL MEDICINE
Payer: COMMERCIAL

## 2019-06-05 ENCOUNTER — TELEPHONE (OUTPATIENT)
Dept: CARDIOLOGY CLINIC | Facility: CLINIC | Age: 59
End: 2019-06-05

## 2019-06-05 ENCOUNTER — OFFICE VISIT (OUTPATIENT)
Dept: ENDOCRINOLOGY CLINIC | Facility: CLINIC | Age: 59
End: 2019-06-05

## 2019-06-05 VITALS
SYSTOLIC BLOOD PRESSURE: 146 MMHG | BODY MASS INDEX: 22 KG/M2 | WEIGHT: 137.63 LBS | HEART RATE: 77 BPM | DIASTOLIC BLOOD PRESSURE: 85 MMHG

## 2019-06-05 DIAGNOSIS — E27.8 ADRENAL NODULE (HCC): ICD-10-CM

## 2019-06-05 DIAGNOSIS — I10 HYPERTENSION, UNSPECIFIED TYPE: ICD-10-CM

## 2019-06-05 DIAGNOSIS — I25.84 CORONARY ARTERY CALCIFICATION: Primary | ICD-10-CM

## 2019-06-05 DIAGNOSIS — E27.8 ADRENAL NODULE (HCC): Primary | ICD-10-CM

## 2019-06-05 DIAGNOSIS — I25.10 CORONARY ARTERY CALCIFICATION: Primary | ICD-10-CM

## 2019-06-05 DIAGNOSIS — R06.02 SHORTNESS OF BREATH: ICD-10-CM

## 2019-06-05 PROCEDURE — 80048 BASIC METABOLIC PNL TOTAL CA: CPT

## 2019-06-05 PROCEDURE — 99213 OFFICE O/P EST LOW 20 MIN: CPT | Performed by: INTERNAL MEDICINE

## 2019-06-05 PROCEDURE — 84244 ASSAY OF RENIN: CPT

## 2019-06-05 PROCEDURE — 82533 TOTAL CORTISOL: CPT

## 2019-06-05 PROCEDURE — 36415 COLL VENOUS BLD VENIPUNCTURE: CPT

## 2019-06-05 PROCEDURE — 82088 ASSAY OF ALDOSTERONE: CPT

## 2019-06-05 RX ORDER — DEXAMETHASONE 1 MG
1 TABLET ORAL ONCE
Qty: 1 TABLET | Refills: 0 | Status: SHIPPED | OUTPATIENT
Start: 2019-06-05 | End: 2019-06-05

## 2019-06-05 NOTE — TELEPHONE ENCOUNTER
Please open encounter, review and sign. Pt has [employer] insurance, and has Nuclear Stress Test ordered. MC to handle PA.

## 2019-06-05 NOTE — PROGRESS NOTES
Return Office Visit     CHIEF COMPLAINT:  Patient presents with: Follow - Up: Adenoma of right adrenal gland       HISTORY OF PRESENT ILLNESS:  Irving Hernandez is a 56year old female who presents for follow up for for evaluation of adrenal adenoma.    She wa HCL  Menthol                     Comment:Other reaction(s):  MENTHOL  Plavix  [Clopidogre*        Comment:Other reaction(s): CLOPIDOGREL BISULFATE  Pseudoephedrine Hcl         Comment:Other reaction(s): PSEUDOEPHEDRINE HCL  Tripelennamine              Comme wheezing  CARDIOVASCULAR:  regular rate and rhythm, normal S1 and S2  ABDOMEN:  soft  SKIN:  no bruising or bleeding, no rashes and no lesions  EXTREMITIES: no edema      DATA:     Pertinent data reviewed    ASSESSMENT AND PLAN:      Patient is a 56 year o

## 2019-06-05 NOTE — PATIENT INSTRUCTIONS
There are three hormones that we are testing your for:    1. Metanephrines: Your blood levels were high, we did urine testing which was also slightly high  Hence we are repeating the urine collection    2.  Aldosterone/ renin  You will do the blood test to

## 2019-06-06 ENCOUNTER — APPOINTMENT (OUTPATIENT)
Dept: LAB | Facility: HOSPITAL | Age: 59
End: 2019-06-06
Attending: INTERNAL MEDICINE
Payer: COMMERCIAL

## 2019-06-06 ENCOUNTER — HOSPITAL ENCOUNTER (OUTPATIENT)
Dept: MAMMOGRAPHY | Age: 59
Discharge: HOME | End: 2019-06-06
Attending: INTERNAL MEDICINE
Payer: COMMERCIAL

## 2019-06-06 DIAGNOSIS — D35.00 ADRENAL ADENOMA, UNSPECIFIED LATERALITY: ICD-10-CM

## 2019-06-06 DIAGNOSIS — Z12.39 BREAST CANCER SCREENING: ICD-10-CM

## 2019-06-06 PROCEDURE — 82570 ASSAY OF URINE CREATININE: CPT

## 2019-06-06 PROCEDURE — 83835 ASSAY OF METANEPHRINES: CPT

## 2019-06-06 PROCEDURE — 77067 SCR MAMMO BI INCL CAD: CPT | Performed by: INTERNAL MEDICINE

## 2019-06-06 PROCEDURE — 77063 BREAST TOMOSYNTHESIS BI: CPT | Performed by: INTERNAL MEDICINE

## 2019-06-07 ENCOUNTER — TELEPHONE (OUTPATIENT)
Dept: CARDIOLOGY CLINIC | Facility: CLINIC | Age: 59
End: 2019-06-07

## 2019-06-07 NOTE — TELEPHONE ENCOUNTER
----- Message from MICHELLE Solitario sent at 6/7/2019  9:18 AM CDT -----  Ca score is high so LDL should be ideally <100 start lipitor 10mg and recheck lipids/AST/ALT in 2 months

## 2019-06-10 ENCOUNTER — TELEPHONE (OUTPATIENT)
Dept: NEUROLOGY | Facility: CLINIC | Age: 59
End: 2019-06-10

## 2019-06-10 ENCOUNTER — TELEPHONE (OUTPATIENT)
Dept: ENDOCRINOLOGY CLINIC | Facility: CLINIC | Age: 59
End: 2019-06-10

## 2019-06-10 DIAGNOSIS — D35.00 ADRENAL ADENOMA, UNSPECIFIED LATERALITY: Primary | ICD-10-CM

## 2019-06-10 NOTE — TELEPHONE ENCOUNTER
Called patient, unable to leave VM  Patient has and adrenal adenoma  Plasma and urine metanephrines have been going up steadily. Also, on her most recent visit, she reported high BP and sweating. These are new symptoms.    She needs further work up for a ph

## 2019-06-10 NOTE — TELEPHONE ENCOUNTER
Received in basket from [email or web address]  advising of approval for physical therapy. Will call Pt. To inform. L/m on Efra's v/m advising 8 PT sessions were approved. Can proceed with scheduling appt.

## 2019-06-11 ENCOUNTER — TELEPHONE (OUTPATIENT)
Dept: CARDIOLOGY CLINIC | Facility: CLINIC | Age: 59
End: 2019-06-11

## 2019-06-11 DIAGNOSIS — E78.5 HYPERLIPIDEMIA, UNSPECIFIED HYPERLIPIDEMIA TYPE: Primary | ICD-10-CM

## 2019-06-11 RX ORDER — EZETIMIBE 10 MG/1
10 TABLET ORAL NIGHTLY
Qty: 90 TABLET | Refills: 1 | Status: SHIPPED | OUTPATIENT
Start: 2019-06-11 | End: 2019-08-08 | Stop reason: ALTCHOICE

## 2019-06-11 NOTE — TELEPHONE ENCOUNTER
Notes recorded by MICHELLE Flowers on 2019 at 4:13 PM CDT  Try zetia 10mg recheck in 2 months  --  S/w Amaya reviewed lipid results and recommendations to start Zetia 10 mg daily and to recheck Lipid panel in 2 months . Questions answered.  Ana Amezcua

## 2019-06-13 ENCOUNTER — TELEPHONE (OUTPATIENT)
Dept: OTOLARYNGOLOGY | Facility: CLINIC | Age: 59
End: 2019-06-13

## 2019-06-13 NOTE — TELEPHONE ENCOUNTER
RN spoke with patient. She is currently in [place] - will be home end of June. Pt asking if she should come home early to have testing? Pt also states she has severe panic attacks and had to \"sleep\" for last MRI.  She is willing to try an open MRI but has n

## 2019-06-13 NOTE — TELEPHONE ENCOUNTER
Dr Teresita Messer patient stated left side of face from ear down to his parotid gland is popping every time he eats and swollen,no pain or discomfort patient is wondering if it is because left ear was not clean. please advise.

## 2019-06-14 NOTE — TELEPHONE ENCOUNTER
Called patient and discussed labs and plan in detail  Patient has some insurance and travel issues.    She will come for blood testing/ MRI in two weeks  She understands how to do all the tests, but will call when she reaches [place] to confirm  We also discusse

## 2019-06-14 NOTE — TELEPHONE ENCOUNTER
Informed patient of note below ,patient verbalized understanding but patient is at [address] will be back not until last week of this month.

## 2019-06-26 NOTE — TELEPHONE ENCOUNTER
Anali Spine in [facility] called w/ questions from anesthesiologist re: MRI scheduled for 7/1/19. Please call.

## 2019-06-26 NOTE — TELEPHONE ENCOUNTER
Spoke w/ Reta Bumpers RN in preadmission testing.  Anesthesiologist will not clear patient for MRI w/ general anesthesia unless she has   -full h&p within 30 days of procedure  -controlled BP --> recommends alpha blocker for \"a couple of weeks\" before ready f

## 2019-06-26 NOTE — TELEPHONE ENCOUNTER
RN spoke with patient who verbalized understanding to cancel MRI and call Dr. Kianna Steel to book apt w/ them at [facility] for [facility]. Pt states she will call them first thing tomorrow morning.  RN cancelled MRI per patient's request. She will also call PCP fo

## 2019-07-05 NOTE — TELEPHONE ENCOUNTER
[facility], labs, OV notes. Pt has appt scheduled for 7/8/19.  Please fax to [phone]

## 2019-07-10 NOTE — TELEPHONE ENCOUNTER
Patient had apt with Dr. Karen Villalta at [facility] on 7/8  Please FU with her  Thanks

## 2019-07-12 NOTE — TELEPHONE ENCOUNTER
Noted, please tell him her try to make apt ASAP  Her metanpehrines are elevated and I am worried about a pheochromocytoma  Should be taken care of ASAP.    Please call her wed next week to make sure she has an apt  Thanks

## 2019-07-12 NOTE — TELEPHONE ENCOUNTER
Patient had to cancel appt due to transportation issues. She is rescheduling but unsure of exact date, but will be later this month.

## 2019-07-18 ENCOUNTER — TELEPHONE (OUTPATIENT)
Dept: ENDOCRINOLOGY CLINIC | Facility: CLINIC | Age: 59
End: 2019-07-18

## 2019-07-18 NOTE — TELEPHONE ENCOUNTER
Pt states that she was referred to Dr. Brenda Green or Dr. Prem Álvarez at [facility] but there is a problem with the referral.  No other details given. Please call.

## 2019-07-19 NOTE — TELEPHONE ENCOUNTER
Spoke to pt, [facility] has appt scheduled with Dr. Cheng Escobar or Dr. Isra Rendon at [facility] scheduled on 8/2/19. [facility] [place] will be needing a referral from pcp to see the mds d/t having HMO insurance.  Informed pt she would have to contact Dr. Ky Pearson office to reques

## 2019-07-19 NOTE — TELEPHONE ENCOUNTER
Dr. Cornelius Sanabria, [facility] [place]- pt states has made an appt on 8/2/19 states this is the soonest she can get as she has no car at the moment. Pt aware the importance of keeping this appt.

## 2019-07-23 ENCOUNTER — TELEPHONE (OUTPATIENT)
Dept: INTERNAL MEDICINE CLINIC | Facility: CLINIC | Age: 59
End: 2019-07-23

## 2019-07-23 DIAGNOSIS — D35.00 ADRENAL ADENOMA, UNSPECIFIED LATERALITY: Primary | ICD-10-CM

## 2019-07-23 DIAGNOSIS — R91.1 PULMONARY NODULE: Primary | ICD-10-CM

## 2019-07-23 NOTE — TELEPHONE ENCOUNTER
Order for ct chest in [facility]. No labs needed for me ; she just did labs for cardiologist. She does labs ordered from dr Gwendolyn Duval. She will be due to see me in Nov 2019.

## 2019-07-23 NOTE — TELEPHONE ENCOUNTER
Spoke with patient who you last seen on 05/01/19. Pt was suppose to have a CT done in 3 months from 04/30/19 for a pulmonary nodule that was seen in her heart scan. Pt asking if this could be ordered and she can have this done next month.     Secondly, anthony

## 2019-07-23 NOTE — TELEPHONE ENCOUNTER
Patient called requesting referral to Dr. Charisse Varela (Endocrine Surgeon) at [facility] for Adrenal Adenoma. Please sign off if agree.     Charisse Varela MD  Endocrine Surgeon, [address]  [phone]      Thank you,  [address] [place] [address]

## 2019-08-06 ENCOUNTER — APPOINTMENT (OUTPATIENT)
Dept: LAB | Facility: HOSPITAL | Age: 59
End: 2019-08-06
Attending: INTERNAL MEDICINE
Payer: COMMERCIAL

## 2019-08-06 ENCOUNTER — HOSPITAL ENCOUNTER (OUTPATIENT)
Dept: CT IMAGING | Facility: HOSPITAL | Age: 59
Discharge: HOME | End: 2019-08-06
Attending: INTERNAL MEDICINE
Payer: COMMERCIAL

## 2019-08-06 DIAGNOSIS — R91.1 PULMONARY NODULE: ICD-10-CM

## 2019-08-06 DIAGNOSIS — E78.5 HYPERLIPIDEMIA, UNSPECIFIED HYPERLIPIDEMIA TYPE: ICD-10-CM

## 2019-08-06 DIAGNOSIS — E27.8 ADRENAL NODULE (HCC): ICD-10-CM

## 2019-08-06 DIAGNOSIS — D35.00 ADRENAL ADENOMA, UNSPECIFIED LATERALITY: ICD-10-CM

## 2019-08-06 LAB
ANION GAP SERPL CALC-SCNC: 9 MMOL/L (ref 0–18)
BUN BLD-MCNC: 9 MG/DL (ref 7–18)
BUN/CREAT SERPL: 13.2 (ref 10–20)
CALCIUM BLD-MCNC: 9.1 MG/DL (ref 8.5–10.1)
CHLORIDE SERPL-SCNC: 107 MMOL/L (ref 98–112)
CHOLEST SMN-MCNC: 237 MG/DL (ref ?–200)
CO2 SERPL-SCNC: 24 MMOL/L (ref 21–32)
CORTIS SERPL-MCNC: 12.8 UG/DL
CREAT BLD-MCNC: 0.68 MG/DL (ref 0.55–1.02)
GLUCOSE BLD-MCNC: 107 MG/DL (ref 70–99)
HDLC SERPL-MCNC: 65 MG/DL (ref 40–59)
LDLC SERPL CALC-MCNC: 151 MG/DL (ref ?–100)
NONHDLC SERPL-MCNC: 172 MG/DL (ref ?–130)
OSMOLALITY SERPL CALC.SUM OF ELEC: 289 MOSM/KG (ref 275–295)
PATIENT FASTING: YES
PATIENT FASTING: YES
POTASSIUM SERPL-SCNC: 4.1 MMOL/L (ref 3.5–5.1)
SODIUM SERPL-SCNC: 140 MMOL/L (ref 136–145)
TRIGL SERPL-MCNC: 106 MG/DL (ref 30–149)
VLDLC SERPL CALC-MCNC: 21 MG/DL (ref 0–30)

## 2019-08-06 PROCEDURE — 82088 ASSAY OF ALDOSTERONE: CPT

## 2019-08-06 PROCEDURE — 71250 CT THORAX DX C-: CPT | Performed by: INTERNAL MEDICINE

## 2019-08-06 PROCEDURE — 36415 COLL VENOUS BLD VENIPUNCTURE: CPT

## 2019-08-06 PROCEDURE — 80048 BASIC METABOLIC PNL TOTAL CA: CPT

## 2019-08-06 PROCEDURE — 80061 LIPID PANEL: CPT

## 2019-08-06 PROCEDURE — 82533 TOTAL CORTISOL: CPT

## 2019-08-07 ENCOUNTER — TELEPHONE (OUTPATIENT)
Dept: INTERNAL MEDICINE CLINIC | Facility: CLINIC | Age: 59
End: 2019-08-07

## 2019-08-07 DIAGNOSIS — J34.89 NOSE PAIN: Primary | ICD-10-CM

## 2019-08-07 LAB — ALDOSTERONE: 8.7 NG/DL

## 2019-08-07 NOTE — TELEPHONE ENCOUNTER
Pt called in stating that she is seeing Dr. Mary Beth Iqbal on 8/8 at 2:10pm.     She states that it is for nose/nostril pain on the L side. Her currently referral is for a different dx and was told she needs a new one.     Pt states that she is only in town fo

## 2019-08-07 NOTE — TELEPHONE ENCOUNTER
Contacted patient to inform her of approved referral, patient verbalizes understanding and had no further questions.

## 2019-08-08 ENCOUNTER — TELEPHONE (OUTPATIENT)
Dept: ENDOCRINOLOGY CLINIC | Facility: CLINIC | Age: 59
End: 2019-08-08

## 2019-08-08 ENCOUNTER — OFFICE VISIT (OUTPATIENT)
Dept: OTOLARYNGOLOGY | Facility: CLINIC | Age: 59
End: 2019-08-08

## 2019-08-08 VITALS
HEART RATE: 91 BPM | DIASTOLIC BLOOD PRESSURE: 88 MMHG | SYSTOLIC BLOOD PRESSURE: 145 MMHG | BODY MASS INDEX: 22.06 KG/M2 | WEIGHT: 134 LBS | HEIGHT: 65.5 IN

## 2019-08-08 DIAGNOSIS — J34.89 NASAL VESTIBULITIS: Primary | ICD-10-CM

## 2019-08-08 DIAGNOSIS — R49.0 HOARSENESS: ICD-10-CM

## 2019-08-08 PROCEDURE — 99214 OFFICE O/P EST MOD 30 MIN: CPT | Performed by: OTOLARYNGOLOGY

## 2019-08-08 RX ORDER — AMOXICILLIN AND CLAVULANATE POTASSIUM 875; 125 MG/1; MG/1
1 TABLET, FILM COATED ORAL EVERY 12 HOURS
Qty: 20 TABLET | Refills: 0 | Status: SHIPPED | OUTPATIENT
Start: 2019-08-08 | End: 2019-12-03 | Stop reason: ALTCHOICE

## 2019-08-08 NOTE — PROGRESS NOTES
Tal Aj is a 59year old female.   Patient presents with:  Sinus Problem: crusty scabbing and some pain in the left nostril ,patient thinks her eyes may be involved as well ,states mucus type drainage is constant ,,patient is not taking any medication Spouse name: Not on file      Number of children: Not on file      Years of education: Not on file      Highest education level: Not on file    Tobacco Use      Smoking status: Current Every Day Smoker        Packs/day: 1.00        Years: 40.00        Pack and vision changes. Respiratory Negative Dyspnea and wheezing. Cardio Negative Chest pain, irregular heartbeat/palpitations and syncope. GI Negative Abdominal pain and diarrhea. Endocrine Negative Cold intolerance and heat intolerance.    Neuro Nega every 12 (twelve) hours. , Disp: 20 tablet, Rfl: 0  •  mupirocin 2 % External Ointment, Apply 1 Application topically 3 (three) times daily. , Disp: 1 Tube, Rfl: 0  •  aspirin 325 MG Oral Tab, Take 325 mg by mouth daily. , Disp: , Rfl:   •  cholecalciferol (D

## 2019-08-09 NOTE — TELEPHONE ENCOUNTER
Dr. Harvey Panfilo to pt, states she has not called to schedule. [facility] was informed by Dr. Tova Sullivan to have scan done sometime in sept. [facility] will call back if she has any issues scheduling.

## 2019-08-09 NOTE — TELEPHONE ENCOUNTER
Patient has been recommended a MIBG scan by Dr. Dexter Armstrong from [facility] for [place] of her adrenal mass. Does she need any assistance in scheduling?   Please let me know ( I can talk to Dr. Davina Martinez)  Thanks

## 2019-08-13 ENCOUNTER — TELEPHONE (OUTPATIENT)
Dept: CARDIOLOGY CLINIC | Facility: CLINIC | Age: 59
End: 2019-08-13

## 2019-08-13 DIAGNOSIS — E78.00 HYPERCHOLESTEROLEMIA: Primary | ICD-10-CM

## 2019-08-13 RX ORDER — ATORVASTATIN CALCIUM 20 MG/1
20 TABLET, FILM COATED ORAL NIGHTLY
Qty: 90 TABLET | Refills: 0 | Status: SHIPPED | OUTPATIENT
Start: 2019-08-13 | End: 2020-03-12

## 2019-08-13 NOTE — TELEPHONE ENCOUNTER
When she calls back will need to see which statins she has tried, and see if she can tolerate one she has not been on yet

## 2019-08-13 NOTE — TELEPHONE ENCOUNTER
Notes recorded by MICHELLE Hooks on 8/12/2019 at 10:47 AM CDT  LDL is high, she also has high calcium score, ideally she needs statin.  Try lipitor 20mg with recheck in 2 months, she also needs to do stress test if not scheduled yet    Chart revi

## 2019-08-22 ENCOUNTER — TELEPHONE (OUTPATIENT)
Dept: INTERNAL MEDICINE CLINIC | Facility: CLINIC | Age: 59
End: 2019-08-22

## 2019-08-22 DIAGNOSIS — D35.00 ADRENAL ADENOMA, UNSPECIFIED LATERALITY: Primary | ICD-10-CM

## 2019-08-22 NOTE — TELEPHONE ENCOUNTER
Modesto Graves from [facility] called and requested referral to [facility] for patient to have [address] for Mass on R gland. Please sign off if agree.     [facility] [id number]  P [phone]

## 2019-08-26 NOTE — TELEPHONE ENCOUNTER
Verified HIPAA, LM that we want to know if she is taking the atorvastatin and if tolerating it okay.  LMTCB

## 2019-09-03 NOTE — TELEPHONE ENCOUNTER
A message was left for this patient requesting a call back to the [facility] HOSPITAL department regarding the referral request to have the Nuclear Med scan done at [facility].  [facility] will only allow this to be done at [facility] if there is a clinical reason it must be done there an

## 2019-09-10 ENCOUNTER — TELEPHONE (OUTPATIENT)
Dept: OTOLARYNGOLOGY | Facility: CLINIC | Age: 59
End: 2019-09-10

## 2019-09-10 NOTE — TELEPHONE ENCOUNTER
Dr Barnard Blazing patient stated after she took the antibiotic she felt better,the crusty scab was gone but now   both nostrils are painful to touch  and left nostril has crusty scab again it's been going on for 4 days now,Advised pt per last note that pt is due f

## 2019-09-10 NOTE — TELEPHONE ENCOUNTER
S/w Amaya, states she took zetia and crestor, and welchol. But refuses to take any medication due to muscle pain. Discussed why md wants her on it. States she understands but won't take it. Mailed low cholesterol diet to pt for more information.

## 2019-09-10 NOTE — TELEPHONE ENCOUNTER
Pt states she is having nostril problem again, this time it is affecting both nostrils, asking if JDO would prescribe abx? Pls advise thank you.

## 2019-09-12 RX ORDER — AMOXICILLIN AND CLAVULANATE POTASSIUM 875; 125 MG/1; MG/1
1 TABLET, FILM COATED ORAL EVERY 12 HOURS
Qty: 20 TABLET | Refills: 0 | Status: SHIPPED | OUTPATIENT
Start: 2019-09-12 | End: 2019-12-03 | Stop reason: ALTCHOICE

## 2019-09-12 NOTE — TELEPHONE ENCOUNTER
She can have Augmentin 875 twice daily for the next 10 days she should use her mupirocin ointment 3 times daily.   She will most likely need to be reevaluated to see why she has these recurrent infections and to also perform her laryngoscopy when she return

## 2019-11-08 ENCOUNTER — TELEPHONE (OUTPATIENT)
Dept: OTOLARYNGOLOGY | Facility: CLINIC | Age: 59
End: 2019-11-08

## 2019-11-08 ENCOUNTER — TELEPHONE (OUTPATIENT)
Dept: ENDOCRINOLOGY CLINIC | Facility: CLINIC | Age: 59
End: 2019-11-08

## 2019-11-08 NOTE — TELEPHONE ENCOUNTER
Patient wanted to know if Dr Hien Lara received NM Body Scan results from [facility]. Please call. Thank you.

## 2019-11-08 NOTE — TELEPHONE ENCOUNTER
Pt. states that she continues to have problem with crust build up at end of nostril and having pain. Pt. States that it seems to be affecting the inside part of the eye/ corner towards the nose. Please call to discuss.

## 2019-11-08 NOTE — TELEPHONE ENCOUNTER
Per TE from 9/10/19 pt called about this issue at that time. Dr. Sonya Cortes prescribed Augmentin 875 mg BID daily for 10 days and mupirocin ointment TID. Also recommended reevaluation in office to investigate why they infections keep re-occurring.  Possible Lar

## 2019-11-08 NOTE — TELEPHONE ENCOUNTER
Patient returned call. C/o crusting leisons bilateral nostrils. States she picks off the lesions every day. Ongoing since LOV. They are painful when she picks them off. Some bleeding after picking but not bleeding now.  No discharge or major swelling that c

## 2019-11-12 ENCOUNTER — NURSE TRIAGE (OUTPATIENT)
Dept: INTERNAL MEDICINE CLINIC | Facility: CLINIC | Age: 59
End: 2019-11-12

## 2019-11-12 ENCOUNTER — TELEPHONE (OUTPATIENT)
Dept: OPHTHALMOLOGY | Facility: CLINIC | Age: 59
End: 2019-11-12

## 2019-11-12 NOTE — TELEPHONE ENCOUNTER
Patient states she has crusting in eyes that will not go away. Patient states she spoke to Dr. Elke Hu nurse was informed to speak to PCP . Transferring to triage.

## 2019-11-12 NOTE — TELEPHONE ENCOUNTER
Pt complains of red, pain, irritation OS >OD x 6 months. Pt is scheduled next week with Dr. Mary Ann Hawkins. Apt booked at 1:00 next Tuesday. Mary Ann Hawkins follow up at 1:40 same day.

## 2019-11-12 NOTE — TELEPHONE ENCOUNTER
Left detailed message for Amaya letting her know that we have not received scan result. RN/CMA: please contact Phillip for scan report.    Phillip ph to Dr Alok Patino office [phone]

## 2019-11-15 NOTE — TELEPHONE ENCOUNTER
LMTCB with office number below  RN note: also keep an eye on care everywhere there is a delay to upload results, may show up in a few days.

## 2019-11-16 ENCOUNTER — TELEPHONE (OUTPATIENT)
Dept: OPHTHALMOLOGY | Facility: CLINIC | Age: 59
End: 2019-11-16

## 2019-11-16 DIAGNOSIS — H57.89 EYE REDNESS: Primary | ICD-10-CM

## 2019-11-16 NOTE — TELEPHONE ENCOUNTER
Dr. Liam Shelley Please see note below and advise. Per appt note with Dr. Stacie Lemus pt scheduled for NP/ OV- OU crusting and redness x 6 months.

## 2019-11-16 NOTE — TELEPHONE ENCOUNTER
Phone call to patient on cell phone number. . Recording states voice mail not set up. Unable to leave recording. Called home number; LMTCB. Office number given. Need to tell patient that referral to Dr. Darrian Laird is ready to p/u.

## 2019-11-18 ENCOUNTER — TELEPHONE (OUTPATIENT)
Dept: OPHTHALMOLOGY | Facility: CLINIC | Age: 59
End: 2019-11-18

## 2019-11-18 NOTE — TELEPHONE ENCOUNTER
Per pt had to cancel appt tomorrow due to her car not working, states it will not be fixed until Friday. Pt lives in [facility] and has another appt at [facility] at 2:10pm on 11/26 , asking for an appt on the same day. Please advise thank you.

## 2019-11-18 NOTE — TELEPHONE ENCOUNTER
We dont really see results from [facility]. She should call the doctor she saw and who ordered the tests to discuss the result with them. I also dont understand labs she is asking for about checking for infection.  She needs to have ov so this can be evaluated

## 2019-11-18 NOTE — TELEPHONE ENCOUNTER
Pt aware of referral and is now asking if her results from [facility] have been seen. Pt also requesting labs to check for infection and requesting call back when these have been done.

## 2019-11-21 NOTE — TELEPHONE ENCOUNTER
Spoke with patient and she had a nuclear adrenal test that is now viewable in care everywhere from [facility]. ( I had to request and update and it loaded).  Pt is asking for your opinion on the test, I suggested her to contact the ordering provider but she want

## 2019-11-25 ENCOUNTER — TELEPHONE (OUTPATIENT)
Dept: OPHTHALMOLOGY | Facility: CLINIC | Age: 59
End: 2019-11-25

## 2019-11-25 NOTE — TELEPHONE ENCOUNTER
Per pt had to cancel appt on 11/26, asking for appt on 12/3, has other appt at [facility] @ 2:40pm. Please advise thank you.

## 2019-11-25 NOTE — TELEPHONE ENCOUNTER
Appointment was scheduled on 12/3/19 at 1 pm with Dr. Dallas Morin. Symptoms of tearing and crusting both eyes for 6 months- started after a cat scratch to the finger.

## 2019-11-26 ENCOUNTER — TELEPHONE (OUTPATIENT)
Dept: INTERNAL MEDICINE CLINIC | Facility: CLINIC | Age: 59
End: 2019-11-26

## 2019-11-26 NOTE — TELEPHONE ENCOUNTER
Patient called in requesting a follow up appointment with Dr. Mikhail Jiménez next week either Wednesday or Thursday. He states that he is in town from [place] and needs to follow up after his specialist appointments. Please advise.

## 2019-11-27 NOTE — TELEPHONE ENCOUNTER
Talked to patient told her message below and she understood, she made an appointment with other doctor.

## 2019-11-27 NOTE — TELEPHONE ENCOUNTER
Call center please assist with scheduling a follow up, thanks    Please reply to pool: [facility]

## 2019-12-03 ENCOUNTER — OFFICE VISIT (OUTPATIENT)
Dept: OPHTHALMOLOGY | Facility: CLINIC | Age: 59
End: 2019-12-03

## 2019-12-03 ENCOUNTER — OFFICE VISIT (OUTPATIENT)
Dept: OTOLARYNGOLOGY | Facility: CLINIC | Age: 59
End: 2019-12-03

## 2019-12-03 VITALS
HEIGHT: 66 IN | DIASTOLIC BLOOD PRESSURE: 90 MMHG | WEIGHT: 140 LBS | BODY MASS INDEX: 22.5 KG/M2 | TEMPERATURE: 98 F | SYSTOLIC BLOOD PRESSURE: 158 MMHG

## 2019-12-03 DIAGNOSIS — H01.116 CONTACT DERMATITIS OF EYELIDS OF BOTH EYES: ICD-10-CM

## 2019-12-03 DIAGNOSIS — H02.883 MEIBOMIAN GLAND DYSFUNCTION (MGD) OF BOTH EYES: ICD-10-CM

## 2019-12-03 DIAGNOSIS — H01.113 CONTACT DERMATITIS OF EYELIDS OF BOTH EYES: ICD-10-CM

## 2019-12-03 DIAGNOSIS — H02.886 MEIBOMIAN GLAND DYSFUNCTION (MGD) OF BOTH EYES: ICD-10-CM

## 2019-12-03 DIAGNOSIS — K04.7 PERIAPICAL ABSCESS: Primary | ICD-10-CM

## 2019-12-03 DIAGNOSIS — J34.89 NASAL VESTIBULITIS: ICD-10-CM

## 2019-12-03 DIAGNOSIS — D23.10 PAPILLOMA OF EYELID, LEFT: ICD-10-CM

## 2019-12-03 DIAGNOSIS — H04.203 TEARING EYES: Primary | ICD-10-CM

## 2019-12-03 PROCEDURE — 99242 OFF/OP CONSLTJ NEW/EST SF 20: CPT | Performed by: OPHTHALMOLOGY

## 2019-12-03 PROCEDURE — 99214 OFFICE O/P EST MOD 30 MIN: CPT | Performed by: OTOLARYNGOLOGY

## 2019-12-03 NOTE — PROGRESS NOTES
Loreta Dillard is a 59year old female. HPI:     HPI     Consult      Additional comments: Per Dr. Basil Guzman              Comments     Here for an evaluation of tearing, redness and crusting nasally x 6 months OS>OD.   Pt denies ever having any surgery to Packs/day: 1.00        Years: 40.00        Pack years: 40        Types: Cigarettes      Smokeless tobacco: Never Used      Tobacco comment: 1.50 units per day, 25 years, 37 unit years    Alcohol use: Yes      Comment: 8 drinks weekly    Drug use: No      M Musculoskeletal, HENT, Endocrine, Cardiovascular, Respiratory, Psychiatric, Allergic/Imm, Heme/Lymph    Last edited by Qasim Moon OVICK on 12/3/2019  1:23 PM. (History)          PHYSICAL EXAM:     Base Eye Exam     Visual Acuity (Snellen - Linear) either eye. Patient was instructed to use warm compresses to the eyelids twice a day everyday. Instructions for warm compress use:   Patient should place wash compresses on both eyelids for 5 minutes every morning and every night.   After 5 minutes of

## 2019-12-03 NOTE — ASSESSMENT & PLAN NOTE
No cause found for eye tearing. Reassured patient that there is no infection, inflammation, foreign body or abrasion in either eye. Patient was instructed to use warm compresses to the eyelids twice a day everyday.     Instructions for warm compress use:

## 2019-12-03 NOTE — TELEPHONE ENCOUNTER
Dr. Irene Sherwood, there is now a NM adrenal scan available in [facility] from [facility] dated 9/20/19. Please let us know if these are the results in question. If so, we can call pt to book an appt to discuss. Thanks.

## 2019-12-04 ENCOUNTER — HOSPITAL ENCOUNTER (OUTPATIENT)
Dept: CT IMAGING | Facility: HOSPITAL | Age: 59
Discharge: HOME | End: 2019-12-04
Attending: OTOLARYNGOLOGY
Payer: COMMERCIAL

## 2019-12-04 ENCOUNTER — TELEPHONE (OUTPATIENT)
Dept: OTOLARYNGOLOGY | Facility: CLINIC | Age: 59
End: 2019-12-04

## 2019-12-04 DIAGNOSIS — K04.7 PERIAPICAL ABSCESS: ICD-10-CM

## 2019-12-04 PROCEDURE — 70486 CT MAXILLOFACIAL W/O DYE: CPT | Performed by: OTOLARYNGOLOGY

## 2019-12-04 RX ORDER — AMOXICILLIN AND CLAVULANATE POTASSIUM 875; 125 MG/1; MG/1
1 TABLET, FILM COATED ORAL EVERY 12 HOURS
Qty: 20 TABLET | Refills: 0 | Status: SHIPPED | OUTPATIENT
Start: 2019-12-04 | End: 2019-12-23

## 2019-12-04 NOTE — TELEPHONE ENCOUNTER
Spoke with pharmacy who has now received the 2 prescriptions. Spoke with pt who understands pharmacy will have Rx ready today.

## 2019-12-04 NOTE — TELEPHONE ENCOUNTER
Pt called stating pt had appointment 12-3-19. Pt's pharmacy has not received the antibiotic. Please send to raj in [facility].   Call pt to advise

## 2019-12-04 NOTE — ADDENDUM NOTE
Addended by: Mita Bourgeois on: 12/4/2019 05:40 AM     Modules accepted: Orders Instructed patient/caregiver regarding signs and symptoms of infection./Verbal/written post procedure instructions were given to patient/caregiver.

## 2019-12-04 NOTE — PROGRESS NOTES
Tuyet Reyes is a 59year old female. Patient presents with:   Follow - Up: regarding nasal vestibulitis and hoarseness, no change in symptoms       HISTORY OF PRESENT ILLNESS  She has had a previous [employer] laryngoscopy with removal of vocal cord poly returned to see me for personal reasons. She now presents with complaints of persistent discomfort and crusting intranasally right at the openings bilaterally.   Also having significant pain in her nose at the midline by the columella with significant pain COMPLETE  11-    Scanned to media tab 11-   • HYSTERECTOMY      Partial hysterectomy   • KWASI LOCALIZATION WIRE 1 SITE LEFT (CPT=19281)     • OOPHORECTOMY Right     Removal OD, 1 ovary -pertonitis   • OTHER SURGICAL HISTORY      Laryngoscopy Normal.   Skin Normal Inspection - Normal.        Lymph Detail Normal Submental. Submandibular. Anterior cervical. Posterior cervical. Supraclavicular.         Nose/Mouth/Throat Normal External nose -formation of the skin at the level of the vestibule bilat her nose. Hurts to purse her lips. On examination today she does have significant degeneration of the alveolus of the maxilla by her 2 front teeth the teeth are very loose.   I suspect some kind of chronic osteitis and she would most likely benefit from h

## 2019-12-05 ENCOUNTER — APPOINTMENT (OUTPATIENT)
Dept: LAB | Facility: HOSPITAL | Age: 59
End: 2019-12-05
Attending: NURSE PRACTITIONER
Payer: COMMERCIAL

## 2019-12-05 ENCOUNTER — OFFICE VISIT (OUTPATIENT)
Dept: INTERNAL MEDICINE CLINIC | Facility: CLINIC | Age: 59
End: 2019-12-05

## 2019-12-05 ENCOUNTER — TELEPHONE (OUTPATIENT)
Dept: OTOLARYNGOLOGY | Facility: CLINIC | Age: 59
End: 2019-12-05

## 2019-12-05 VITALS
DIASTOLIC BLOOD PRESSURE: 89 MMHG | HEIGHT: 66 IN | HEART RATE: 71 BPM | SYSTOLIC BLOOD PRESSURE: 166 MMHG | TEMPERATURE: 98 F | WEIGHT: 135 LBS | BODY MASS INDEX: 21.69 KG/M2

## 2019-12-05 DIAGNOSIS — J01.00 ACUTE MAXILLARY SINUSITIS, RECURRENCE NOT SPECIFIED: ICD-10-CM

## 2019-12-05 DIAGNOSIS — R03.0 ELEVATED BP WITHOUT DIAGNOSIS OF HYPERTENSION: ICD-10-CM

## 2019-12-05 DIAGNOSIS — E78.00 HYPERCHOLESTEROLEMIA: ICD-10-CM

## 2019-12-05 DIAGNOSIS — J01.00 ACUTE MAXILLARY SINUSITIS, RECURRENCE NOT SPECIFIED: Primary | ICD-10-CM

## 2019-12-05 PROCEDURE — 84460 ALANINE AMINO (ALT) (SGPT): CPT

## 2019-12-05 PROCEDURE — 80061 LIPID PANEL: CPT

## 2019-12-05 PROCEDURE — 84450 TRANSFERASE (AST) (SGOT): CPT

## 2019-12-05 PROCEDURE — 36415 COLL VENOUS BLD VENIPUNCTURE: CPT

## 2019-12-05 PROCEDURE — 85027 COMPLETE CBC AUTOMATED: CPT

## 2019-12-05 PROCEDURE — 99213 OFFICE O/P EST LOW 20 MIN: CPT | Performed by: INTERNAL MEDICINE

## 2019-12-05 NOTE — PROGRESS NOTES
Elizabeth Brareto is a 59year old female. Patient presents with:  Lab: pt would like to have labs done, thinks she may have some kind of infection    HPI:   57-year-old female with a past medical history of recurrent sinusitis, tobacco abuse here for blood test (chronic obstructive pulmonary disease) [facility]    • Esophagitis    • Gastritis 2010    EGD with biopsy   • Other and unspecified hyperlipidemia    • Vocal cord polyps     Laryngeal polyps; rt larynx vocal cord biopsy 2007      Past Surgical History:   Proce Comment:Other reaction(s): TRIPROLIDINE HCL  Radiology Contrast *    ITCHING     REVIEW OF SYSTEMS:     Review of Systems   Constitutional: Negative for activity change, appetite change and fever. HENT: Negative for congestion and voice change.     Respir pharmacotherapy. She would like to start when she sees her PCP. Discussed low-salt diet and tobacco cessation. Plan: As above. To see PCP in 2 weeks      The patient indicates understanding of these issues and agrees to the plan.   No follow-ups on fi

## 2019-12-05 NOTE — TELEPHONE ENCOUNTER
LMTCB to inform patient that per Dr Garcia Pickup for patient to get a copy of disc of Ct scan of sinus from medical records at [facility] so that she can bring with her in her appointment with the dentist or oral surgeon.

## 2019-12-06 ENCOUNTER — TELEPHONE (OUTPATIENT)
Dept: INTERNAL MEDICINE CLINIC | Facility: CLINIC | Age: 59
End: 2019-12-06

## 2019-12-06 ENCOUNTER — OFFICE VISIT (OUTPATIENT)
Dept: OTOLARYNGOLOGY | Facility: CLINIC | Age: 59
End: 2019-12-06

## 2019-12-06 VITALS
SYSTOLIC BLOOD PRESSURE: 130 MMHG | WEIGHT: 135 LBS | DIASTOLIC BLOOD PRESSURE: 78 MMHG | HEIGHT: 66 IN | BODY MASS INDEX: 21.69 KG/M2 | TEMPERATURE: 97 F

## 2019-12-06 DIAGNOSIS — K04.7 PERIAPICAL ABSCESS: Primary | ICD-10-CM

## 2019-12-06 DIAGNOSIS — J34.89 NASAL VESTIBULITIS: Primary | ICD-10-CM

## 2019-12-06 DIAGNOSIS — R49.0 HOARSENESS: ICD-10-CM

## 2019-12-06 DIAGNOSIS — J34.89 NASAL VESTIBULITIS: ICD-10-CM

## 2019-12-06 PROCEDURE — 99214 OFFICE O/P EST MOD 30 MIN: CPT | Performed by: OTOLARYNGOLOGY

## 2019-12-06 NOTE — TELEPHONE ENCOUNTER
S/W patient for clarification related to referral. Patient states Dr. Qiana Rutherford told her he needs to see her today to discuss results of CT scan she had done. Advised patient referral needs to be redone and I will call her when it is ready.  Patient agrees to t

## 2019-12-07 NOTE — PROGRESS NOTES
Tuyet Reyes is a 59year old female.   Patient presents with:  Results: CT sinus done on 12-4-19      HISTORY OF PRESENT ILLNESS  She has had a previous [employer] laryngoscopy with removal of vocal cord polyps in 2007 by Dr. Horton Lasso smokes 1 pack a da presents with complaints of persistent discomfort and crusting intranasally right at the openings bilaterally. Also having significant pain in her nose at the midline by the columella with significant pain discomfort when she purses her lips.   Significant • Diabetes Mother    • Heart Disease Mother    • Diabetes Sister    • Glaucoma Neg    • Macular degeneration Neg        Past Medical History:   Diagnosis Date   • Acute, but ill-defined, cerebrovascular disease     had TIA and stroke   • Molina's esopha Orientation - Oriented to time, place, person & situation. Appropriate mood and affect.    Neck Exam Normal Inspection - Normal. Palpation - Normal. Parotid gland - Normal. Thyroid gland - Normal.   Eyes Normal Conjunctiva - Right: Normal, Left: Normal. Pup MG Oral Tab, Take 1 tablet (10 mg total) by mouth daily. , Disp: 30 tablet, Rfl: 3  •  Fluticasone Propionate 50 MCG/ACT Nasal Suspension, 1 spray by Nasal route 2 (two) times daily. , Disp: 1 Bottle, Rfl: 3  •  aspirin 325 MG Oral Tab, Take 325 mg by mouth

## 2019-12-09 ENCOUNTER — TELEPHONE (OUTPATIENT)
Dept: ENDOCRINOLOGY CLINIC | Facility: CLINIC | Age: 59
End: 2019-12-09

## 2019-12-23 RX ORDER — AMOXICILLIN AND CLAVULANATE POTASSIUM 875; 125 MG/1; MG/1
TABLET, FILM COATED ORAL
Qty: 20 TABLET | Refills: 0 | Status: SHIPPED | OUTPATIENT
Start: 2019-12-23 | End: 2020-03-12

## 2019-12-23 NOTE — TELEPHONE ENCOUNTER
Patient's LOV on 12/06/19 for Periapical abscess, Nasal vestibulitis, Hoarseness. Patient is requesting a refill. Please advise.

## 2020-01-30 ENCOUNTER — TELEPHONE (OUTPATIENT)
Dept: ENDOCRINOLOGY CLINIC | Facility: CLINIC | Age: 60
End: 2020-01-30

## 2020-03-04 NOTE — TELEPHONE ENCOUNTER
Dr Abarca Liat patient last visit on 12/6/19 periapical abscess ,stated crusting to her both nostrils are back again,no fever,no discharges,requesting a refill ,stated cannot afford to see a dentist,please advise.

## 2020-03-05 ENCOUNTER — TELEPHONE (OUTPATIENT)
Dept: INTERNAL MEDICINE CLINIC | Facility: CLINIC | Age: 60
End: 2020-03-05

## 2020-03-05 ENCOUNTER — TELEPHONE (OUTPATIENT)
Dept: ENDOCRINOLOGY CLINIC | Facility: CLINIC | Age: 60
End: 2020-03-05

## 2020-03-05 DIAGNOSIS — D35.00 ADRENAL ADENOMA, UNSPECIFIED LATERALITY: Primary | ICD-10-CM

## 2020-03-05 RX ORDER — AMOXICILLIN AND CLAVULANATE POTASSIUM 875; 125 MG/1; MG/1
TABLET, FILM COATED ORAL
Qty: 20 TABLET | Refills: 0 | OUTPATIENT
Start: 2020-03-05

## 2020-03-05 NOTE — TELEPHONE ENCOUNTER
Patient stated Dr. Una Laird informed her to call Dr. Massimo Miles to refill   AMOXICILLIN-POT CLAVULANATE 875-125 MG Oral Tab. Thank you.

## 2020-03-05 NOTE — TELEPHONE ENCOUNTER
Patient called back requesting an appt with Dr. Ronan Mai next week. Informed patient that Dr. Ronan Mai is out of town until 3/23. Patient states she lives 4 hours away and has a lot of medical issues that makes it hard for her to make it to appts.  Inform

## 2020-03-05 NOTE — TELEPHONE ENCOUNTER
Pt called to request an appt sooner than first available (see also 1/30/20 closed TE). Call transferred to RN.

## 2020-03-08 RX ORDER — AMOXICILLIN AND CLAVULANATE POTASSIUM 875; 125 MG/1; MG/1
TABLET, FILM COATED ORAL
Qty: 20 TABLET | Refills: 0 | OUTPATIENT
Start: 2020-03-08

## 2020-03-08 NOTE — TELEPHONE ENCOUNTER
7- 8 am cortisol, BMP, renin, von, metanephrines' Okay to fit in first two weeks after I get back.  Overbook in am please  Thanks

## 2020-03-12 ENCOUNTER — OFFICE VISIT (OUTPATIENT)
Dept: INTERNAL MEDICINE CLINIC | Facility: CLINIC | Age: 60
End: 2020-03-12

## 2020-03-12 VITALS
DIASTOLIC BLOOD PRESSURE: 76 MMHG | WEIGHT: 132 LBS | HEIGHT: 66 IN | BODY MASS INDEX: 21.21 KG/M2 | TEMPERATURE: 98 F | SYSTOLIC BLOOD PRESSURE: 126 MMHG | OXYGEN SATURATION: 99 % | HEART RATE: 89 BPM

## 2020-03-12 DIAGNOSIS — I25.84 CORONARY ARTERY CALCIFICATION: ICD-10-CM

## 2020-03-12 DIAGNOSIS — J34.89 NASAL VESTIBULITIS: ICD-10-CM

## 2020-03-12 DIAGNOSIS — Z12.11 COLON CANCER SCREENING: ICD-10-CM

## 2020-03-12 DIAGNOSIS — K04.7 PERIAPICAL ABSCESS: ICD-10-CM

## 2020-03-12 DIAGNOSIS — I25.10 CORONARY ARTERY CALCIFICATION: ICD-10-CM

## 2020-03-12 DIAGNOSIS — R91.8 PULMONARY NODULES: ICD-10-CM

## 2020-03-12 DIAGNOSIS — Z01.419 WELL FEMALE EXAM WITH ROUTINE GYNECOLOGICAL EXAM: ICD-10-CM

## 2020-03-12 DIAGNOSIS — Z00.00 ANNUAL PHYSICAL EXAM: Primary | ICD-10-CM

## 2020-03-12 DIAGNOSIS — D35.00 ADRENAL ADENOMA, UNSPECIFIED LATERALITY: ICD-10-CM

## 2020-03-12 DIAGNOSIS — K22.719 BARRETT'S ESOPHAGUS WITH DYSPLASIA: ICD-10-CM

## 2020-03-12 PROCEDURE — 99396 PREV VISIT EST AGE 40-64: CPT | Performed by: INTERNAL MEDICINE

## 2020-03-12 RX ORDER — AMOXICILLIN AND CLAVULANATE POTASSIUM 875; 125 MG/1; MG/1
TABLET, FILM COATED ORAL
Qty: 20 TABLET | Refills: 0 | Status: SHIPPED | OUTPATIENT
Start: 2020-03-12 | End: 2020-03-31

## 2020-03-12 NOTE — PROGRESS NOTES
HPI:    Patient ID: Justice Kay is a 59year old female. Patient presents today for her annual physical.      Review of Systems   Constitutional: Negative. HENT: Positive for congestion and dental problem. Eyes: Negative.     Respiratory: Negative BISULFATE  Pseudoephedrine Hcl         Comment:Other reaction(s): PSEUDOEPHEDRINE HCL  Statins                 OTHER (SEE COMMENTS)    Comment:Muscle aches  Tripelennamine              Comment:Other reaction(s): TRIPELENNAMINE HCL  Triprolidine Hcl METABOLIC PANEL (14), LIPID PANEL, HEMOGLOBIN         A1C, VITAMIN D, 25-HYDROXY        routuine labs ordered. Pt declined flu shot and pneumonia shot.     (K04.7) Periapical abscess  Plan: ENT - INTERNAL        [t referred back to ENT.  I did refill augmen

## 2020-03-13 ENCOUNTER — OFFICE VISIT (OUTPATIENT)
Dept: OTOLARYNGOLOGY | Facility: CLINIC | Age: 60
End: 2020-03-13

## 2020-03-13 ENCOUNTER — LAB ENCOUNTER (OUTPATIENT)
Dept: LAB | Facility: HOSPITAL | Age: 60
End: 2020-03-13
Attending: INTERNAL MEDICINE
Payer: COMMERCIAL

## 2020-03-13 VITALS — SYSTOLIC BLOOD PRESSURE: 167 MMHG | TEMPERATURE: 98 F | DIASTOLIC BLOOD PRESSURE: 99 MMHG | HEART RATE: 73 BPM

## 2020-03-13 DIAGNOSIS — D35.00 ADRENAL ADENOMA, UNSPECIFIED LATERALITY: ICD-10-CM

## 2020-03-13 DIAGNOSIS — Z00.00 ANNUAL PHYSICAL EXAM: ICD-10-CM

## 2020-03-13 DIAGNOSIS — K04.7 PERIAPICAL ABSCESS: Primary | ICD-10-CM

## 2020-03-13 DIAGNOSIS — J34.89 NASAL VESTIBULITIS: ICD-10-CM

## 2020-03-13 LAB
ALBUMIN SERPL-MCNC: 4.1 G/DL (ref 3.4–5)
ALBUMIN/GLOB SERPL: 1.1 {RATIO} (ref 1–2)
ALP LIVER SERPL-CCNC: 70 U/L (ref 50–130)
ALT SERPL-CCNC: 45 U/L (ref 13–56)
ANION GAP SERPL CALC-SCNC: 6 MMOL/L (ref 0–18)
AST SERPL-CCNC: 30 U/L (ref 15–37)
BASOPHILS # BLD AUTO: 0.08 X10(3) UL (ref 0–0.2)
BASOPHILS NFR BLD AUTO: 0.9 %
BILIRUB SERPL-MCNC: 0.5 MG/DL (ref 0.1–2)
BUN BLD-MCNC: 8 MG/DL (ref 7–18)
BUN/CREAT SERPL: 12.1 (ref 10–20)
CALCIUM BLD-MCNC: 9.1 MG/DL (ref 8.5–10.1)
CHLORIDE SERPL-SCNC: 107 MMOL/L (ref 98–112)
CHOLEST SMN-MCNC: 262 MG/DL (ref ?–200)
CO2 SERPL-SCNC: 27 MMOL/L (ref 21–32)
CORTIS SERPL-MCNC: 10.6 UG/DL
CREAT BLD-MCNC: 0.66 MG/DL (ref 0.55–1.02)
DEPRECATED RDW RBC AUTO: 46.3 FL (ref 35.1–46.3)
EOSINOPHIL # BLD AUTO: 0.09 X10(3) UL (ref 0–0.7)
EOSINOPHIL NFR BLD AUTO: 1 %
ERYTHROCYTE [DISTWIDTH] IN BLOOD BY AUTOMATED COUNT: 12.9 % (ref 11–15)
EST. AVERAGE GLUCOSE BLD GHB EST-MCNC: 114 MG/DL (ref 68–126)
GLOBULIN PLAS-MCNC: 3.6 G/DL (ref 2.8–4.4)
GLUCOSE BLD-MCNC: 96 MG/DL (ref 70–99)
HBA1C MFR BLD HPLC: 5.6 % (ref ?–5.7)
HCT VFR BLD AUTO: 46.9 % (ref 35–48)
HDLC SERPL-MCNC: 82 MG/DL (ref 40–59)
HGB BLD-MCNC: 17 G/DL (ref 12–16)
IMM GRANULOCYTES # BLD AUTO: 0.06 X10(3) UL (ref 0–1)
IMM GRANULOCYTES NFR BLD: 0.6 %
LDLC SERPL CALC-MCNC: 151 MG/DL (ref ?–100)
LYMPHOCYTES # BLD AUTO: 1.56 X10(3) UL (ref 1–4)
LYMPHOCYTES NFR BLD AUTO: 16.7 %
M PROTEIN MFR SERPL ELPH: 7.7 G/DL (ref 6.4–8.2)
MCH RBC QN AUTO: 36.5 PG (ref 26–34)
MCHC RBC AUTO-ENTMCNC: 36.2 G/DL (ref 31–37)
MCV RBC AUTO: 100.6 FL (ref 80–100)
MONOCYTES # BLD AUTO: 0.79 X10(3) UL (ref 0.1–1)
MONOCYTES NFR BLD AUTO: 8.5 %
NEUTROPHILS # BLD AUTO: 6.75 X10 (3) UL (ref 1.5–7.7)
NEUTROPHILS # BLD AUTO: 6.75 X10(3) UL (ref 1.5–7.7)
NEUTROPHILS NFR BLD AUTO: 72.3 %
NONHDLC SERPL-MCNC: 180 MG/DL (ref ?–130)
OSMOLALITY SERPL CALC.SUM OF ELEC: 288 MOSM/KG (ref 275–295)
PATIENT FASTING Y/N/NP: YES
PATIENT FASTING Y/N/NP: YES
PLATELET # BLD AUTO: 196 10(3)UL (ref 150–450)
POTASSIUM SERPL-SCNC: 4.6 MMOL/L (ref 3.5–5.1)
RBC # BLD AUTO: 4.66 X10(6)UL (ref 3.8–5.3)
SODIUM SERPL-SCNC: 140 MMOL/L (ref 136–145)
TRIGL SERPL-MCNC: 144 MG/DL (ref 30–149)
VLDLC SERPL CALC-MCNC: 29 MG/DL (ref 0–30)
WBC # BLD AUTO: 9.3 X10(3) UL (ref 4–11)

## 2020-03-13 PROCEDURE — 85025 COMPLETE CBC W/AUTO DIFF WBC: CPT

## 2020-03-13 PROCEDURE — 83036 HEMOGLOBIN GLYCOSYLATED A1C: CPT

## 2020-03-13 PROCEDURE — 80061 LIPID PANEL: CPT

## 2020-03-13 PROCEDURE — 36415 COLL VENOUS BLD VENIPUNCTURE: CPT

## 2020-03-13 PROCEDURE — 82306 VITAMIN D 25 HYDROXY: CPT

## 2020-03-13 PROCEDURE — 99214 OFFICE O/P EST MOD 30 MIN: CPT | Performed by: OTOLARYNGOLOGY

## 2020-03-13 PROCEDURE — 82533 TOTAL CORTISOL: CPT

## 2020-03-13 PROCEDURE — 83835 ASSAY OF METANEPHRINES: CPT

## 2020-03-13 PROCEDURE — 80053 COMPREHEN METABOLIC PANEL: CPT

## 2020-03-14 NOTE — PROGRESS NOTES
Tuyet Reyes is a 59year old female. Patient presents with:   Follow - Up: 3 month f/u, doesn't feel well still, hasn't seen dentist, PCP prescribed abx, hasn't started them yet  Abscess: periapical abscess of tooth root      HISTORY OF PRESENT ILLNESS vestibules bilaterally.  Finish antibiotic and symptoms return but she never returned to see me for personal reasons. Onur Peña now presents with complaints of persistent discomfort and crusting intranasally right at the openings bilaterally.  Also having signi Highest education level: Not on file    Tobacco Use      Smoking status: Current Every Day Smoker        Packs/day: 1.00        Years: 40.00        Pack years: 40        Types: Cigarettes      Smokeless tobacco: Never Used      Tobacco comment: 1.50 units Details   Constitutional Negative Fatigue, fever and weight loss. ENMT Negative Drooling. Eyes Negative Blurred vision and vision changes. Respiratory Negative Dyspnea and wheezing.    Cardio Negative Chest pain, irregular heartbeat/palpitations and s Normal, Left: Normal.       Current Outpatient Medications:   •  Amoxicillin-Pot Clavulanate 875-125 MG Oral Tab, TAKE 1 TABLET BY MOUTH EVERY 12 HOURS, Disp: 20 tablet, Rfl: 0  •  aspirin 325 MG Oral Tab, Take 325 mg by mouth daily. , Disp: , Rfl:   •  cho

## 2020-03-15 LAB
METANEPHRINE: 0.56 NMOL/L
NORMETANEPHRINE: 1.08 NMOL/L

## 2020-03-16 LAB — 25(OH)D3 SERPL-MCNC: 5.9 NG/ML (ref 30–100)

## 2020-03-22 ENCOUNTER — TELEPHONE (OUTPATIENT)
Dept: INTERNAL MEDICINE CLINIC | Facility: CLINIC | Age: 60
End: 2020-03-22

## 2020-03-22 DIAGNOSIS — D75.89 MACROCYTOSIS WITHOUT ANEMIA: Primary | ICD-10-CM

## 2020-03-23 ENCOUNTER — TELEPHONE (OUTPATIENT)
Dept: INTERNAL MEDICINE CLINIC | Facility: CLINIC | Age: 60
End: 2020-03-23

## 2020-03-23 NOTE — TELEPHONE ENCOUNTER
If she is deficient with vit B12/folic acid because of not eating well then her mouth/dental issues could be contributing but will need to see if she is really deficient with the blood test  Her cholesterol is even higher than last year.  I know she cant to

## 2020-03-23 NOTE — TELEPHONE ENCOUNTER
Patient contacted (Name and  of pt verified). All results and recommendations reviewed. Patient stated she does not want to take the Vitamin D 50.000 units due to the side effects,.    She did take down the OTC Vitamin D3    She also has an ongoing m

## 2020-03-23 NOTE — TELEPHONE ENCOUNTER
----- Message from Claudene Newborn, MD sent at 3/22/2020 10:13 AM CDT -----  Notify pt; her vit D level is very low; pls  start vit D 50,000 units po weekly for 12 weeks and then take over the counter vit D3 at 2, 000 units daily thereafter.  We will ch

## 2020-03-23 NOTE — TELEPHONE ENCOUNTER
RN Triage, please assist patient with Dr. Yolanda Tim  abnormal lab results message and instructions below. Thank you.

## 2020-03-24 NOTE — TELEPHONE ENCOUNTER
She can take otc vit D3 and it's available at 5,000 units which she can take daily po. Recheck vit D in 3mos.

## 2020-03-24 NOTE — TELEPHONE ENCOUNTER
Spoke w/ patient - discussed starting VitD3 5000 units daily by mouth. Pt states she has VitD3 at home and will take 4000 units daily until that supply runs out. Patient advised to recheck VitD levels in 3mo.   Patient verbalized understanding of plan, no

## 2020-03-24 NOTE — TELEPHONE ENCOUNTER
Informed pt provider's instructions. Pt states she is in [place] and not for sure when she will be able to have blood draw. Per pt do not want to try any lowering cholesterol medications.  Pt also is requesting a daily Vit D. Pt states she read side effec

## 2020-03-30 ENCOUNTER — TELEPHONE (OUTPATIENT)
Dept: INTERNAL MEDICINE CLINIC | Facility: CLINIC | Age: 60
End: 2020-03-30

## 2020-03-30 NOTE — TELEPHONE ENCOUNTER
Current Outpatient Medications   Medication Sig Dispense Refill   • Amoxicillin-Pot Clavulanate 875-125 MG Oral Tab TAKE 1 TABLET BY MOUTH EVERY 12 HOURS 20 tablet 0

## 2020-03-31 RX ORDER — AMOXICILLIN AND CLAVULANATE POTASSIUM 875; 125 MG/1; MG/1
TABLET, FILM COATED ORAL
Qty: 20 TABLET | Refills: 0 | Status: SHIPPED | OUTPATIENT
Start: 2020-03-31 | End: 2020-05-18

## 2020-03-31 NOTE — TELEPHONE ENCOUNTER
I will refill her augmentin for the last time since she cant continue keep on taking abx since definitive therapy is for dental surgeon treatment as Dr Supa Huston told her.  antibiotic unfortunately also has potential side effects that can be life threatening t

## 2020-03-31 NOTE — TELEPHONE ENCOUNTER
LMTCB transfer to triage    OV 3/12/20 (K04.7) Periapical abscess  Plan: ENT - INTERNAL        [t referred back to ENT.  I did refill augmentin

## 2020-03-31 NOTE — TELEPHONE ENCOUNTER
Dr Beverley Mcgarry, please advise on patient's script request.    Patient saw Dr Irasema Crowley 3/13/20, who recommended he get the underlying dental issue addressed, but patient cannot schedule with anyone due to covid19 situation.  He said that Dr Irasema Crowley thought he sh

## 2020-04-15 ENCOUNTER — VIRTUAL PHONE E/M (OUTPATIENT)
Dept: ENDOCRINOLOGY CLINIC | Facility: CLINIC | Age: 60
End: 2020-04-15

## 2020-04-15 ENCOUNTER — TELEPHONE (OUTPATIENT)
Dept: ENDOCRINOLOGY CLINIC | Facility: CLINIC | Age: 60
End: 2020-04-15

## 2020-04-15 ENCOUNTER — TELEPHONE (OUTPATIENT)
Dept: INTERNAL MEDICINE CLINIC | Facility: CLINIC | Age: 60
End: 2020-04-15

## 2020-04-15 DIAGNOSIS — D35.00 ADRENAL ADENOMA, UNSPECIFIED LATERALITY: Primary | ICD-10-CM

## 2020-04-15 PROCEDURE — 99213 OFFICE O/P EST LOW 20 MIN: CPT | Performed by: INTERNAL MEDICINE

## 2020-04-15 NOTE — TELEPHONE ENCOUNTER
I will like to talk to Dr. Rossy Sin at [facility] about this mutual patient  Could you please call his office to see what is the best way I can get in touch with him?    If he will like to call me, please pass on my cell phone number  Thanks

## 2020-04-15 NOTE — PROGRESS NOTES
Virtual Telephone Check-In    Holli Min verbally consents to a Virtual/Telephone Check-In visit on 04/15/20. Patient understands and accepts financial responsibility for any deductible, co-insurance and/or co-pays associated with this service.     Dur high, but not more than 2-3 x ULN. MIBG no significant uptake in right adenoma  Discussed with Dr. Elina Tate  Will repeat a urine collection and plasma levels  4.  Imaging: CT adrenal without contrast    Further management will be based on results  Also discu

## 2020-04-15 NOTE — TELEPHONE ENCOUNTER
Dr. Arden Jernigan Pt is scheduled to see Dr. Irene Sherwood today for Adrenal adenoma ffup & needs referral please advise.

## 2020-04-17 NOTE — TELEPHONE ENCOUNTER
I spoke with Dr. Isra Rendon  Please let the patient know that he recommends testing the urine metanephrines also    Hence plan will be:   1.  Come to the lab between 7-8 am and do blood test: BMP, cortisol, renin aldosterone, plasma metanephrine and collect co

## 2020-04-17 NOTE — TELEPHONE ENCOUNTER
Spoke with patient on phone regarding plan about labs and 24 urine collection in 2-3 months. Pt agreeable.

## 2020-04-26 ENCOUNTER — TELEPHONE (OUTPATIENT)
Dept: ENDOCRINOLOGY CLINIC | Facility: CLINIC | Age: 60
End: 2020-04-26

## 2020-04-26 DIAGNOSIS — D35.00 ADRENAL ADENOMA, UNSPECIFIED LATERALITY: Primary | ICD-10-CM

## 2020-04-26 NOTE — TELEPHONE ENCOUNTER
I had a telephone visit with the patient   I was looking at her chart afterwards and wanted to clarify how to get lab testing    She lives in [place], and will come to [place]5 days to get testing done at [address] [place] [address] ( per her choice)    1.  Do cortisol ( between 7-8

## 2020-04-27 NOTE — TELEPHONE ENCOUNTER
Spoke with pt, she is aware of all instructions. She does not want to take dexamethasone. Pt has an upcoming CT lung that she scheduled in 2 weeks, and states she will get her imaging done then.      Pt advised of all lab work at [employer], 24 hr urine colla

## 2020-05-13 ENCOUNTER — TELEPHONE (OUTPATIENT)
Dept: INTERNAL MEDICINE CLINIC | Facility: CLINIC | Age: 60
End: 2020-05-13

## 2020-05-13 DIAGNOSIS — K04.7 PERIAPICAL ABSCESS: Primary | ICD-10-CM

## 2020-05-13 DIAGNOSIS — Z12.31 BREAST CANCER SCREENING BY MAMMOGRAM: Primary | ICD-10-CM

## 2020-05-13 NOTE — TELEPHONE ENCOUNTER
Dr. Scooby Saunders, patient is requesting a mammogram order. Last mammogram was completed 06/06/2019. Please advise on order.      BI-RADS CATEGORY:     DIAGNOSTIC CATEGORY 2--BENIGN FINDING:       RECOMMENDATIONS:    ROUTINE MAMMOGRAM AND CLINICAL EVALUATION I

## 2020-05-13 NOTE — TELEPHONE ENCOUNTER
Per patient Dr Toan Mejia told him to go and see Dr Melonie Gore and Oral surgeon OMN#[phone] Fax# [phone].  Patient needs to go and see this doctor due to her Sinus issue and per patient her referral needs to come from Dr Dayanara Soliman. And patient

## 2020-05-18 RX ORDER — AMOXICILLIN AND CLAVULANATE POTASSIUM 875; 125 MG/1; MG/1
TABLET, FILM COATED ORAL
Qty: 20 TABLET | Refills: 0 | Status: SHIPPED | OUTPATIENT
Start: 2020-05-18 | End: 2021-06-30 | Stop reason: ALTCHOICE

## 2020-05-18 NOTE — TELEPHONE ENCOUNTER
S/W patient and related Kourtney Holding from [employer] [facility]'s message that patient's health insurance denied coverage for tooth abscess. Insurance states tooth abscess meli dental issue and patient needs to utilize dental insurance.  Patient states,\"No. This is not a

## 2020-05-18 NOTE — TELEPHONE ENCOUNTER
Hello,    Please see message below from patient's health plan. Thank you, Sandeep Braswell Specialist    Managed Care. - Message -----  From: Palma Rizzo   Sent: 5/18/2020  10:21 AM CDT  To: Lauren [facility] Managed Care - Main  Rolando [place]

## 2020-05-18 NOTE — TELEPHONE ENCOUNTER
Nguyen Franco, Dr. Rishabh Nance cannot see pt until June 11 , they are not seeing pt until June. Pt seen 03/13 for periapical abscess and nasal vestibulitis.  Pt requesting a refill for Augmentin , please advise

## 2020-05-19 NOTE — TELEPHONE ENCOUNTER
Hello,    Please note that patient's office visit with Dr. Sonya Cortes on 03/13/20, was sent to health plan to review. As stated health plan states that periapical abscess is not coverage under patient's medical benefits.     Thank you, Nandini Noguera Sp

## 2020-05-19 NOTE — TELEPHONE ENCOUNTER
Message left on patient's secured voice mail that her insurance plan states the abscess is a dental problem and not covered by you health insurance; but, would be covered by dental insurance.  If you have further questions I suggest you call your insurance

## 2020-05-21 ENCOUNTER — HOSPITAL ENCOUNTER (OUTPATIENT)
Dept: CT IMAGING | Age: 60
Discharge: HOME | End: 2020-05-21
Attending: INTERNAL MEDICINE
Payer: COMMERCIAL

## 2020-05-21 DIAGNOSIS — D35.00 ADRENAL ADENOMA, UNSPECIFIED LATERALITY: ICD-10-CM

## 2020-05-21 DIAGNOSIS — R91.8 PULMONARY NODULES: ICD-10-CM

## 2020-05-21 PROCEDURE — 74150 CT ABDOMEN W/O CONTRAST: CPT | Performed by: INTERNAL MEDICINE

## 2020-05-21 PROCEDURE — 71250 CT THORAX DX C-: CPT | Performed by: INTERNAL MEDICINE

## 2020-05-22 ENCOUNTER — HOSPITAL ENCOUNTER (OUTPATIENT)
Dept: MAMMOGRAPHY | Age: 60
Discharge: HOME | End: 2020-05-22
Attending: INTERNAL MEDICINE
Payer: COMMERCIAL

## 2020-05-22 ENCOUNTER — APPOINTMENT (OUTPATIENT)
Dept: LAB | Age: 60
End: 2020-05-22
Attending: INTERNAL MEDICINE
Payer: COMMERCIAL

## 2020-05-22 DIAGNOSIS — Z12.31 BREAST CANCER SCREENING BY MAMMOGRAM: ICD-10-CM

## 2020-05-22 DIAGNOSIS — D75.89 MACROCYTOSIS WITHOUT ANEMIA: ICD-10-CM

## 2020-05-22 PROCEDURE — 84443 ASSAY THYROID STIM HORMONE: CPT

## 2020-05-22 PROCEDURE — 36415 COLL VENOUS BLD VENIPUNCTURE: CPT | Performed by: INTERNAL MEDICINE

## 2020-05-22 PROCEDURE — 77067 SCR MAMMO BI INCL CAD: CPT | Performed by: INTERNAL MEDICINE

## 2020-05-22 PROCEDURE — 82746 ASSAY OF FOLIC ACID SERUM: CPT | Performed by: INTERNAL MEDICINE

## 2020-05-22 PROCEDURE — 77063 BREAST TOMOSYNTHESIS BI: CPT | Performed by: INTERNAL MEDICINE

## 2020-05-22 PROCEDURE — 82607 VITAMIN B-12: CPT | Performed by: INTERNAL MEDICINE

## 2020-05-24 ENCOUNTER — TELEPHONE (OUTPATIENT)
Dept: ENDOCRINOLOGY CLINIC | Facility: CLINIC | Age: 60
End: 2020-05-24

## 2020-05-24 NOTE — TELEPHONE ENCOUNTER
CT abdomen shows stable adrenal adenoma  I will let her know once I get results of blood work  Thanks

## 2020-05-26 NOTE — TELEPHONE ENCOUNTER
Spoke to patient and relayed Dr. Maxim Garcia message as shown below. Patient verbalized understanding of whole message and had no further questions at this time.

## 2020-06-30 ENCOUNTER — PATIENT OUTREACH (OUTPATIENT)
Dept: INTERNAL MEDICINE CLINIC | Facility: CLINIC | Age: 60
End: 2020-06-30

## 2020-06-30 NOTE — PROGRESS NOTES
Called pt and left message to call back    Pt needs an appt with Dr. Dwayne Carias to have her bp checked, last bp readings was elevated. onetouch meter, strips, lancing device and lancets erx

## 2020-07-20 RX ORDER — AMOXICILLIN AND CLAVULANATE POTASSIUM 875; 125 MG/1; MG/1
TABLET, FILM COATED ORAL
Qty: 20 TABLET | Refills: 0 | OUTPATIENT
Start: 2020-07-20

## 2020-07-24 ENCOUNTER — LAB ENCOUNTER (OUTPATIENT)
Dept: LAB | Age: 60
End: 2020-07-24
Attending: INTERNAL MEDICINE
Payer: COMMERCIAL

## 2020-07-24 ENCOUNTER — OFFICE VISIT (OUTPATIENT)
Dept: INTERNAL MEDICINE CLINIC | Facility: CLINIC | Age: 60
End: 2020-07-24

## 2020-07-24 VITALS
RESPIRATION RATE: 12 BRPM | WEIGHT: 134 LBS | DIASTOLIC BLOOD PRESSURE: 76 MMHG | HEIGHT: 66 IN | HEART RATE: 80 BPM | BODY MASS INDEX: 21.53 KG/M2 | TEMPERATURE: 99 F | SYSTOLIC BLOOD PRESSURE: 124 MMHG

## 2020-07-24 DIAGNOSIS — J38.1 VOCAL CORD POLYPS: ICD-10-CM

## 2020-07-24 DIAGNOSIS — I25.10 CORONARY ARTERY CALCIFICATION: ICD-10-CM

## 2020-07-24 DIAGNOSIS — Z20.822 EXPOSURE TO COVID-19 VIRUS: ICD-10-CM

## 2020-07-24 DIAGNOSIS — I25.84 CORONARY ARTERY CALCIFICATION: ICD-10-CM

## 2020-07-24 DIAGNOSIS — E55.9 VITAMIN D DEFICIENCY: ICD-10-CM

## 2020-07-24 DIAGNOSIS — R91.8 PULMONARY NODULES: ICD-10-CM

## 2020-07-24 DIAGNOSIS — E78.00 HYPERCHOLESTEREMIA: Primary | ICD-10-CM

## 2020-07-24 DIAGNOSIS — K04.7 PERIAPICAL ABSCESS: ICD-10-CM

## 2020-07-24 PROCEDURE — 86769 SARS-COV-2 COVID-19 ANTIBODY: CPT

## 2020-07-24 PROCEDURE — 36415 COLL VENOUS BLD VENIPUNCTURE: CPT

## 2020-07-24 PROCEDURE — 99214 OFFICE O/P EST MOD 30 MIN: CPT | Performed by: INTERNAL MEDICINE

## 2020-07-24 PROCEDURE — 82306 VITAMIN D 25 HYDROXY: CPT

## 2020-07-24 PROCEDURE — 3008F BODY MASS INDEX DOCD: CPT | Performed by: INTERNAL MEDICINE

## 2020-07-24 PROCEDURE — 3074F SYST BP LT 130 MM HG: CPT | Performed by: INTERNAL MEDICINE

## 2020-07-24 PROCEDURE — 3078F DIAST BP <80 MM HG: CPT | Performed by: INTERNAL MEDICINE

## 2020-07-24 NOTE — PROGRESS NOTES
HPI:    Patient ID: Sulma Fleming is a 56year old female. Hyperlipidemia   This is a chronic problem. The current episode started more than 1 year ago. The problem is uncontrolled. Recent lipid tests were reviewed and are high.  She has no history of chr HCL  Radiology Contrast *    ITCHING   PHYSICAL EXAM:   Physical Exam   Constitutional: She is oriented to person, place, and time. She appears well-developed and well-nourished. No distress. HENT:   Head: Normocephalic and atraumatic.    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     (K04.7) Periapical abscess  Plan: pt already seen oral surgeon yesterday and recommended  Tooth extract

## 2020-07-26 LAB — SARS-COV-2 IGG SERPLBLD QL IA.RAPID: NEGATIVE

## 2020-07-27 LAB — 25(OH)D3 SERPL-MCNC: 9 NG/ML (ref 30–100)

## 2020-07-30 ENCOUNTER — TELEPHONE (OUTPATIENT)
Dept: INTERNAL MEDICINE CLINIC | Facility: CLINIC | Age: 60
End: 2020-07-30

## 2020-07-30 RX ORDER — ERGOCALCIFEROL (VITAMIN D2) 1250 MCG
50000 CAPSULE ORAL WEEKLY
Qty: 12 CAPSULE | Refills: 0 | Status: SHIPPED | OUTPATIENT
Start: 2020-07-30 | End: 2020-10-16

## 2021-04-12 ENCOUNTER — TELEPHONE (OUTPATIENT)
Dept: INTERNAL MEDICINE CLINIC | Facility: CLINIC | Age: 61
End: 2021-04-12

## 2021-04-12 DIAGNOSIS — Z12.31 ENCOUNTER FOR SCREENING MAMMOGRAM FOR BREAST CANCER: ICD-10-CM

## 2021-04-12 DIAGNOSIS — Z01.419 WELL FEMALE EXAM WITH ROUTINE GYNECOLOGICAL EXAM: ICD-10-CM

## 2021-04-12 DIAGNOSIS — Z12.11 COLON CANCER SCREENING: Primary | ICD-10-CM

## 2021-04-12 DIAGNOSIS — R91.8 PULMONARY NODULES: ICD-10-CM

## 2021-04-12 NOTE — TELEPHONE ENCOUNTER
pls call pt; she needs to get hre screening colonoscopy and papsmear done. Referrals for gi and gyne in epic. She is also due for her ct chest next month for ffup of pulmonary nodules and also her mammogram for next month .  Orders in epic

## 2021-04-20 NOTE — TELEPHONE ENCOUNTER
Message left on secure answering machine, relating Dr. Candelaria Green message below. Office number given.

## 2021-05-02 ENCOUNTER — TELEPHONE (OUTPATIENT)
Dept: INTERNAL MEDICINE CLINIC | Facility: CLINIC | Age: 61
End: 2021-05-02

## 2021-05-02 DIAGNOSIS — R91.8 PULMONARY NODULES: Primary | ICD-10-CM

## 2021-05-02 NOTE — TELEPHONE ENCOUNTER
pls call pt she is due for her ffup ct chest for ffup of her pulmonary nodules.  Order in epic for her ct scan

## 2021-05-03 NOTE — TELEPHONE ENCOUNTER
Home # seems to be incorrect. Voicemail states a different name. Mobile # [employer] has not been set up. Order mailed to patient.

## 2021-06-30 ENCOUNTER — OFFICE VISIT (OUTPATIENT)
Dept: INTERNAL MEDICINE CLINIC | Facility: CLINIC | Age: 61
End: 2021-06-30

## 2021-06-30 ENCOUNTER — HOSPITAL ENCOUNTER (OUTPATIENT)
Dept: CT IMAGING | Age: 61
Discharge: HOME | End: 2021-06-30
Attending: INTERNAL MEDICINE
Payer: COMMERCIAL

## 2021-06-30 ENCOUNTER — LAB ENCOUNTER (OUTPATIENT)
Dept: LAB | Age: 61
End: 2021-06-30
Attending: INTERNAL MEDICINE
Payer: COMMERCIAL

## 2021-06-30 VITALS
SYSTOLIC BLOOD PRESSURE: 134 MMHG | HEART RATE: 80 BPM | RESPIRATION RATE: 12 BRPM | WEIGHT: 126 LBS | HEIGHT: 65 IN | DIASTOLIC BLOOD PRESSURE: 80 MMHG | TEMPERATURE: 98 F | BODY MASS INDEX: 20.99 KG/M2

## 2021-06-30 DIAGNOSIS — I25.10 CORONARY ARTERY CALCIFICATION: ICD-10-CM

## 2021-06-30 DIAGNOSIS — R91.8 PULMONARY NODULES: ICD-10-CM

## 2021-06-30 DIAGNOSIS — E78.00 HYPERCHOLESTEREMIA: ICD-10-CM

## 2021-06-30 DIAGNOSIS — J38.2 VOCAL CORD NODULES: ICD-10-CM

## 2021-06-30 DIAGNOSIS — S30.861D TICK BITE OF ABDOMEN, SUBSEQUENT ENCOUNTER: ICD-10-CM

## 2021-06-30 DIAGNOSIS — Z12.11 COLON CANCER SCREENING: ICD-10-CM
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
## 2021-06-30 DIAGNOSIS — Z00.00 ANNUAL PHYSICAL EXAM: Primary | ICD-10-CM

## 2021-06-30 DIAGNOSIS — Z00.00 ANNUAL PHYSICAL EXAM: ICD-10-CM

## 2021-06-30 DIAGNOSIS — I25.84 CORONARY ARTERY CALCIFICATION: ICD-10-CM

## 2021-06-30 DIAGNOSIS — Z01.419 WELL FEMALE EXAM WITH ROUTINE GYNECOLOGICAL EXAM: ICD-10-CM

## 2021-06-30 DIAGNOSIS — D35.00 ADRENAL ADENOMA, UNSPECIFIED LATERALITY: ICD-10-CM

## 2021-06-30 DIAGNOSIS — K22.719 BARRETT'S ESOPHAGUS WITH DYSPLASIA: ICD-10-CM

## 2021-06-30 PROBLEM — R06.02 SHORTNESS OF BREATH: Status: RESOLVED | Noted: 2019-06-04 | Resolved: 2021-06-30

## 2021-06-30 LAB
ALBUMIN SERPL-MCNC: 4.1 G/DL (ref 3.4–5)
ALBUMIN/GLOB SERPL: 1 {RATIO} (ref 1–2)
ALP LIVER SERPL-CCNC: 75 U/L
ALT SERPL-CCNC: 33 U/L
ANION GAP SERPL CALC-SCNC: 6 MMOL/L (ref 0–18)
AST SERPL-CCNC: 31 U/L (ref 15–37)
BASOPHILS # BLD AUTO: 0.08 X10(3) UL (ref 0–0.2)
BASOPHILS NFR BLD AUTO: 0.9 %
BILIRUB SERPL-MCNC: 0.5 MG/DL (ref 0.1–2)
BILIRUB UR QL: NEGATIVE
BUN BLD-MCNC: 5 MG/DL (ref 7–18)
BUN/CREAT SERPL: 9.4 (ref 10–20)
CALCIUM BLD-MCNC: 9.5 MG/DL (ref 8.5–10.1)
CHLORIDE SERPL-SCNC: 102 MMOL/L (ref 98–112)
CHOLEST SMN-MCNC: 260 MG/DL (ref ?–200)
CLARITY UR: CLEAR
CO2 SERPL-SCNC: 27 MMOL/L (ref 21–32)
COLOR UR: YELLOW
CREAT BLD-MCNC: 0.53 MG/DL
DEPRECATED RDW RBC AUTO: 48.2 FL (ref 35.1–46.3)
EOSINOPHIL # BLD AUTO: 0.13 X10(3) UL (ref 0–0.7)
EOSINOPHIL NFR BLD AUTO: 1.5 %
ERYTHROCYTE [DISTWIDTH] IN BLOOD BY AUTOMATED COUNT: 13.6 % (ref 11–15)
GLOBULIN PLAS-MCNC: 4.1 G/DL (ref 2.8–4.4)
GLUCOSE BLD-MCNC: 99 MG/DL (ref 70–99)
GLUCOSE UR-MCNC: NEGATIVE MG/DL
HCT VFR BLD AUTO: 49.8 %
HDLC SERPL-MCNC: 78 MG/DL (ref 40–59)
HGB BLD-MCNC: 16.3 G/DL
HGB UR QL STRIP.AUTO: NEGATIVE
IMM GRANULOCYTES # BLD AUTO: 0.04 X10(3) UL (ref 0–1)
IMM GRANULOCYTES NFR BLD: 0.5 %
KETONES UR-MCNC: NEGATIVE MG/DL
LDLC SERPL CALC-MCNC: 157 MG/DL (ref ?–100)
LEUKOCYTE ESTERASE UR QL STRIP.AUTO: NEGATIVE
LYMPHOCYTES # BLD AUTO: 1.62 X10(3) UL (ref 1–4)
LYMPHOCYTES NFR BLD AUTO: 19.2 %
M PROTEIN MFR SERPL ELPH: 8.2 G/DL (ref 6.4–8.2)
MCH RBC QN AUTO: 31.3 PG (ref 26–34)
MCHC RBC AUTO-ENTMCNC: 32.7 G/DL (ref 31–37)
MCV RBC AUTO: 95.8 FL
MONOCYTES # BLD AUTO: 0.92 X10(3) UL (ref 0.1–1)
MONOCYTES NFR BLD AUTO: 10.9 %
NEUTROPHILS # BLD AUTO: 5.66 X10 (3) UL (ref 1.5–7.7)
NEUTROPHILS # BLD AUTO: 5.66 X10(3) UL (ref 1.5–7.7)
NEUTROPHILS NFR BLD AUTO: 67 %
NITRITE UR QL STRIP.AUTO: NEGATIVE
NONHDLC SERPL-MCNC: 182 MG/DL (ref ?–130)
OSMOLALITY SERPL CALC.SUM OF ELEC: 277 MOSM/KG (ref 275–295)
PATIENT FASTING Y/N/NP: YES
PATIENT FASTING Y/N/NP: YES
PH UR: 6 [PH] (ref 5–8)
PLATELET # BLD AUTO: 219 10(3)UL (ref 150–450)
POTASSIUM SERPL-SCNC: 4.6 MMOL/L (ref 3.5–5.1)
PROT UR-MCNC: NEGATIVE MG/DL
RBC # BLD AUTO: 5.2 X10(6)UL
SODIUM SERPL-SCNC: 135 MMOL/L (ref 136–145)
SP GR UR STRIP: 1.01 (ref 1–1.03)
TRIGL SERPL-MCNC: 141 MG/DL (ref 30–149)
TSI SER-ACNC: 2.28 MIU/ML (ref 0.36–3.74)
UROBILINOGEN UR STRIP-ACNC: <2
VLDLC SERPL CALC-MCNC: 27 MG/DL (ref 0–30)
WBC # BLD AUTO: 8.5 X10(3) UL (ref 4–11)

## 2021-06-30 PROCEDURE — 3079F DIAST BP 80-89 MM HG: CPT | Performed by: INTERNAL MEDICINE

## 2021-06-30 PROCEDURE — 36415 COLL VENOUS BLD VENIPUNCTURE: CPT

## 2021-06-30 PROCEDURE — 3075F SYST BP GE 130 - 139MM HG: CPT | Performed by: INTERNAL MEDICINE

## 2021-06-30 PROCEDURE — 3008F BODY MASS INDEX DOCD: CPT | Performed by: INTERNAL MEDICINE

## 2021-06-30 PROCEDURE — 82306 VITAMIN D 25 HYDROXY: CPT

## 2021-06-30 PROCEDURE — 81003 URINALYSIS AUTO W/O SCOPE: CPT

## 2021-06-30 PROCEDURE — 99396 PREV VISIT EST AGE 40-64: CPT | Performed by: INTERNAL MEDICINE

## 2021-06-30 PROCEDURE — 80061 LIPID PANEL: CPT

## 2021-06-30 PROCEDURE — 84443 ASSAY THYROID STIM HORMONE: CPT

## 2021-06-30 PROCEDURE — 71250 CT THORAX DX C-: CPT | Performed by: INTERNAL MEDICINE

## 2021-06-30 PROCEDURE — 85025 COMPLETE CBC W/AUTO DIFF WBC: CPT

## 2021-06-30 PROCEDURE — 80053 COMPREHEN METABOLIC PANEL: CPT

## 2021-06-30 PROCEDURE — 86618 LYME DISEASE ANTIBODY: CPT

## 2021-06-30 RX ORDER — LANSOPRAZOLE 15 MG/1
15 CAPSULE, DELAYED RELEASE ORAL DAILY
Qty: 90 CAPSULE | Refills: 0 | Status: SHIPPED | OUTPATIENT
Start: 2021-06-30

## 2021-06-30 NOTE — PROGRESS NOTES
HPI/Subjective:     Patient ID: Hayden Garcia is a 59year old female. Today for her annual physical exam.      History/Other:   Review of Systems   Constitutional: Positive for fatigue. Negative for appetite change and fever. HENT: Negative.     Eyes: Atherosclerosis of coronary artery    • Molina's esophagus    • Cancer ([facility] [address]     cervical   • COPD (chronic obstructive pulmonary disease) (HCC)    • Esophagitis    • Gastritis 2010    EGD with biopsy   • Other and unspecified hyperlipidemia    • Pulmonar Normocephalic and atraumatic. Right Ear: External ear normal.      Left Ear: External ear normal.      Nose: Nose normal.      Mouth/Throat:      Pharynx: No oropharyngeal exudate. Eyes:      General:         Right eye: No discharge.          Left ey follow-up CT of the chest today. I again emphasized the need for the patient to quit there and stop smoking.    (E78.00) Hypercholesteremia  Plan: Check lipid panel. Follow low-fat low-cholesterol diet. She cannot tolerate any statin therapy.   She also given order for stress test at least twice which the patient has never done. She currently denies having had any chest pains. She was also unable to take statins and was he had bad reaction before.           Orders Placed This Encounter      CBC With Diff

## 2021-07-01 ENCOUNTER — HOSPITAL ENCOUNTER (OUTPATIENT)
Dept: MAMMOGRAPHY | Age: 61
Discharge: HOME | End: 2021-07-01
Attending: INTERNAL MEDICINE
Payer: COMMERCIAL

## 2021-07-01 DIAGNOSIS — Z12.31 ENCOUNTER FOR SCREENING MAMMOGRAM FOR BREAST CANCER: ICD-10-CM

## 2021-07-01 PROCEDURE — 77067 SCR MAMMO BI INCL CAD: CPT | Performed by: INTERNAL MEDICINE

## 2021-07-01 PROCEDURE — 77063 BREAST TOMOSYNTHESIS BI: CPT | Performed by: INTERNAL MEDICINE

## 2021-07-02 ENCOUNTER — TELEPHONE (OUTPATIENT)
Dept: INTERNAL MEDICINE CLINIC | Facility: CLINIC | Age: 61
End: 2021-07-02

## 2021-07-02 DIAGNOSIS — I25.84 CORONARY ARTERY CALCIFICATION OF NATIVE ARTERY: Primary | ICD-10-CM

## 2021-07-02 DIAGNOSIS — I25.10 CORONARY ARTERY CALCIFICATION OF NATIVE ARTERY: Primary | ICD-10-CM

## 2021-07-02 DIAGNOSIS — I25.84 CORONARY ARTERY CALCIFICATION: ICD-10-CM

## 2021-07-02 DIAGNOSIS — I10 HYPERTENSION, UNSPECIFIED TYPE: ICD-10-CM

## 2021-07-02 DIAGNOSIS — E78.00 HYPERCHOLESTEREMIA: Primary | ICD-10-CM

## 2021-07-02 DIAGNOSIS — I25.10 CORONARY ARTERY CALCIFICATION: ICD-10-CM

## 2021-07-02 LAB
25(OH)D3 SERPL-MCNC: 5.7 NG/ML (ref 30–100)
B BURGDOR IGG+IGM SER QL: NEGATIVE

## 2021-07-02 RX ORDER — ERGOCALCIFEROL (VITAMIN D2) 1250 MCG
50000 CAPSULE ORAL WEEKLY
Qty: 12 CAPSULE | Refills: 0 | Status: SHIPPED | OUTPATIENT
Start: 2021-07-02 | End: 2021-09-18

## 2021-12-28 LAB — AMB EXT COVID-19 RESULT: DETECTED

## 2021-12-30 ENCOUNTER — TELEPHONE (OUTPATIENT)
Dept: INTERNAL MEDICINE CLINIC | Facility: CLINIC | Age: 61
End: 2021-12-30

## 2021-12-30 NOTE — TELEPHONE ENCOUNTER
Patient calling, confirmed name and . Patient states she is out of town, she has tested positive for Covid, and she is being offered MAB infusion.  She wants to know if she should receive it and states that the facility is requiring her PCP's approval

## 2022-01-05 ENCOUNTER — NURSE TRIAGE (OUTPATIENT)
Dept: INTERNAL MEDICINE CLINIC | Facility: CLINIC | Age: 62
End: 2022-01-05

## 2022-01-05 RX ORDER — AMOXICILLIN AND CLAVULANATE POTASSIUM 875; 125 MG/1; MG/1
1 TABLET, FILM COATED ORAL 2 TIMES DAILY
Qty: 14 TABLET | Refills: 0 | Status: SHIPPED | OUTPATIENT
Start: 2022-01-05 | End: 2022-01-12

## 2022-01-05 NOTE — TELEPHONE ENCOUNTER
Dr.Linchangco RING received a call from pt and she stated that tested positive for covid on 12/28 her s/sx started on 12/24. Pt stated that she is doing fine with the covid s/sx. She retested on 1/3/2022 and it was positive. Pt was informed it can be positive

## 2022-10-12 ENCOUNTER — TELEPHONE (OUTPATIENT)
Dept: INTERNAL MEDICINE CLINIC | Facility: CLINIC | Age: 62
End: 2022-10-12

## 2022-10-12 DIAGNOSIS — Z12.31 ENCOUNTER FOR SCREENING MAMMOGRAM FOR BREAST CANCER: Primary | ICD-10-CM

## 2022-10-12 DIAGNOSIS — Z00.00 ANNUAL PHYSICAL EXAM: ICD-10-CM

## 2022-10-12 DIAGNOSIS — R91.8 PULMONARY NODULES: ICD-10-CM

## 2022-10-12 NOTE — TELEPHONE ENCOUNTER
Spoke with pt, verified , informed of message below from Dr Twin Gonzalez, pt verbalized understanding.

## 2022-10-12 NOTE — TELEPHONE ENCOUNTER
Spoke with pt, verified , physical appt scheduled on 10/18/2022 at 11:30am, pt verbalized understanding. Pt requesting mammogram order to be placed, Dr Alfonzo Morales informed, order placed, pt notified.

## 2022-10-12 NOTE — TELEPHONE ENCOUNTER
Patient calling to request sooner appointment for physical, will be in town on 10/18 and 10/19, but next available is not until 10/26/22. Ph: [phone] if before Monday, cell phone if after Monday.

## 2022-10-12 NOTE — TELEPHONE ENCOUNTER
Patient calling to request mammogram and blood work , and lung scan ahead of physical, physical will be scheduled next week if Dr. Tonie Noonan can fit patient in.

## 2022-10-18 ENCOUNTER — LAB ENCOUNTER (OUTPATIENT)
Dept: LAB | Age: 62
End: 2022-10-18
Attending: INTERNAL MEDICINE
Payer: COMMERCIAL

## 2022-10-18 ENCOUNTER — HOSPITAL ENCOUNTER (OUTPATIENT)
Dept: CT IMAGING | Facility: HOSPITAL | Age: 62
Discharge: HOME | End: 2022-10-18
Attending: INTERNAL MEDICINE
Payer: COMMERCIAL

## 2022-10-18 ENCOUNTER — OFFICE VISIT (OUTPATIENT)
Dept: INTERNAL MEDICINE CLINIC | Facility: CLINIC | Age: 62
End: 2022-10-18
Payer: COMMERCIAL

## 2022-10-18 VITALS
SYSTOLIC BLOOD PRESSURE: 134 MMHG | WEIGHT: 133.5 LBS | TEMPERATURE: 97 F | HEART RATE: 79 BPM | DIASTOLIC BLOOD PRESSURE: 78 MMHG | BODY MASS INDEX: 22.24 KG/M2 | OXYGEN SATURATION: 98 % | HEIGHT: 65 IN

## 2022-10-18 DIAGNOSIS — Z01.419 WELL FEMALE EXAM WITH ROUTINE GYNECOLOGICAL EXAM: ICD-10-CM

## 2022-10-18 DIAGNOSIS — Z00.00 ANNUAL PHYSICAL EXAM: ICD-10-CM

## 2022-10-18 DIAGNOSIS — J38.2 VOCAL CORD NODULES: ICD-10-CM

## 2022-10-18 DIAGNOSIS — R91.8 PULMONARY NODULES: ICD-10-CM

## 2022-10-18 DIAGNOSIS — I25.84 CORONARY ARTERY CALCIFICATION OF NATIVE ARTERY: ICD-10-CM

## 2022-10-18 DIAGNOSIS — K22.719 BARRETT'S ESOPHAGUS WITH DYSPLASIA: ICD-10-CM

## 2022-10-18 DIAGNOSIS — I25.10 CORONARY ARTERY CALCIFICATION OF NATIVE ARTERY: ICD-10-CM

## 2022-10-18 DIAGNOSIS — D35.00 ADRENAL ADENOMA, UNSPECIFIED LATERALITY: ICD-10-CM

## 2022-10-18 DIAGNOSIS — E78.00 HYPERCHOLESTEREMIA: ICD-10-CM

## 2022-10-18 DIAGNOSIS — Z00.00 ANNUAL PHYSICAL EXAM: Primary | ICD-10-CM

## 2022-10-18 DIAGNOSIS — Z12.11 COLON CANCER SCREENING: ICD-10-CM

## 2022-10-18 LAB
ALBUMIN SERPL-MCNC: 4.2 G/DL (ref 3.4–5)
ALBUMIN/GLOB SERPL: 1.1 {RATIO} (ref 1–2)
ALP LIVER SERPL-CCNC: 77 U/L
ALT SERPL-CCNC: 23 U/L
ANION GAP SERPL CALC-SCNC: 6 MMOL/L (ref 0–18)
AST SERPL-CCNC: 19 U/L (ref 15–37)
BASOPHILS # BLD AUTO: 0.08 X10(3) UL (ref 0–0.2)
BASOPHILS NFR BLD AUTO: 1.1 %
BILIRUB SERPL-MCNC: 0.4 MG/DL (ref 0.1–2)
BILIRUB UR QL: NEGATIVE
BUN BLD-MCNC: 7 MG/DL (ref 7–18)
BUN/CREAT SERPL: 12.7 (ref 10–20)
CALCIUM BLD-MCNC: 9.2 MG/DL (ref 8.5–10.1)
CHLORIDE SERPL-SCNC: 101 MMOL/L (ref 98–112)
CHOLEST SERPL-MCNC: 235 MG/DL (ref ?–200)
CLARITY UR: CLEAR
CO2 SERPL-SCNC: 29 MMOL/L (ref 21–32)
COLOR UR: YELLOW
CREAT BLD-MCNC: 0.55 MG/DL
DEPRECATED RDW RBC AUTO: 47.3 FL (ref 35.1–46.3)
EOSINOPHIL # BLD AUTO: 0.1 X10(3) UL (ref 0–0.7)
EOSINOPHIL NFR BLD AUTO: 1.4 %
ERYTHROCYTE [DISTWIDTH] IN BLOOD BY AUTOMATED COUNT: 13.2 % (ref 11–15)
FASTING PATIENT LIPID ANSWER: YES
FASTING STATUS PATIENT QL REPORTED: YES
GFR SERPLBLD BASED ON 1.73 SQ M-ARVRAT: 102 ML/MIN/1.73M2 (ref 60–?)
GLOBULIN PLAS-MCNC: 3.9 G/DL (ref 2.8–4.4)
GLUCOSE BLD-MCNC: 101 MG/DL (ref 70–99)
GLUCOSE UR-MCNC: NEGATIVE MG/DL
HCT VFR BLD AUTO: 48.2 %
HDLC SERPL-MCNC: 70 MG/DL (ref 40–59)
HGB BLD-MCNC: 15.9 G/DL
HGB UR QL STRIP.AUTO: NEGATIVE
IMM GRANULOCYTES # BLD AUTO: 0.05 X10(3) UL (ref 0–1)
IMM GRANULOCYTES NFR BLD: 0.7 %
KETONES UR-MCNC: NEGATIVE MG/DL
LDLC SERPL CALC-MCNC: 135 MG/DL (ref ?–100)
LEUKOCYTE ESTERASE UR QL STRIP.AUTO: NEGATIVE
LYMPHOCYTES # BLD AUTO: 1.3 X10(3) UL (ref 1–4)
LYMPHOCYTES NFR BLD AUTO: 17.7 %
MCH RBC QN AUTO: 32 PG (ref 26–34)
MCHC RBC AUTO-ENTMCNC: 33 G/DL (ref 31–37)
MCV RBC AUTO: 97 FL
MONOCYTES # BLD AUTO: 0.87 X10(3) UL (ref 0.1–1)
MONOCYTES NFR BLD AUTO: 11.9 %
NEUTROPHILS # BLD AUTO: 4.93 X10 (3) UL (ref 1.5–7.7)
NEUTROPHILS # BLD AUTO: 4.93 X10(3) UL (ref 1.5–7.7)
NEUTROPHILS NFR BLD AUTO: 67.2 %
NITRITE UR QL STRIP.AUTO: NEGATIVE
NONHDLC SERPL-MCNC: 165 MG/DL (ref ?–130)
OSMOLALITY SERPL CALC.SUM OF ELEC: 280 MOSM/KG (ref 275–295)
PH UR: 6 [PH] (ref 5–8)
PLATELET # BLD AUTO: 257 10(3)UL (ref 150–450)
POTASSIUM SERPL-SCNC: 4.8 MMOL/L (ref 3.5–5.1)
PROT SERPL-MCNC: 8.1 G/DL (ref 6.4–8.2)
PROT UR-MCNC: NEGATIVE MG/DL
RBC # BLD AUTO: 4.97 X10(6)UL
SODIUM SERPL-SCNC: 136 MMOL/L (ref 136–145)
SP GR UR STRIP: 1.01 (ref 1–1.03)
TRIGL SERPL-MCNC: 171 MG/DL (ref 30–149)
TSI SER-ACNC: 2.37 MIU/ML (ref 0.36–3.74)
UROBILINOGEN UR STRIP-ACNC: <2
VIT C UR-MCNC: NEGATIVE MG/DL
VIT D+METAB SERPL-MCNC: 5.6 NG/ML (ref 30–100)
VLDLC SERPL CALC-MCNC: 31 MG/DL (ref 0–30)
WBC # BLD AUTO: 7.3 X10(3) UL (ref 4–11)

## 2022-10-18 PROCEDURE — 36415 COLL VENOUS BLD VENIPUNCTURE: CPT

## 2022-10-18 PROCEDURE — 3075F SYST BP GE 130 - 139MM HG: CPT | Performed by: INTERNAL MEDICINE

## 2022-10-18 PROCEDURE — 84443 ASSAY THYROID STIM HORMONE: CPT

## 2022-10-18 PROCEDURE — 80061 LIPID PANEL: CPT

## 2022-10-18 PROCEDURE — 80053 COMPREHEN METABOLIC PANEL: CPT

## 2022-10-18 PROCEDURE — 3078F DIAST BP <80 MM HG: CPT | Performed by: INTERNAL MEDICINE

## 2022-10-18 PROCEDURE — 71250 CT THORAX DX C-: CPT | Performed by: INTERNAL MEDICINE

## 2022-10-18 PROCEDURE — 82306 VITAMIN D 25 HYDROXY: CPT

## 2022-10-18 PROCEDURE — 99396 PREV VISIT EST AGE 40-64: CPT | Performed by: INTERNAL MEDICINE

## 2022-10-18 PROCEDURE — 81003 URINALYSIS AUTO W/O SCOPE: CPT

## 2022-10-18 PROCEDURE — 3008F BODY MASS INDEX DOCD: CPT | Performed by: INTERNAL MEDICINE

## 2022-10-18 PROCEDURE — 85025 COMPLETE CBC W/AUTO DIFF WBC: CPT

## 2022-10-20 ENCOUNTER — HOSPITAL ENCOUNTER (OUTPATIENT)
Dept: MAMMOGRAPHY | Age: 62
Discharge: HOME | End: 2022-10-20
Attending: INTERNAL MEDICINE
Payer: COMMERCIAL

## 2022-10-20 DIAGNOSIS — Z12.31 ENCOUNTER FOR SCREENING MAMMOGRAM FOR BREAST CANCER: ICD-10-CM

## 2022-10-20 PROCEDURE — 77067 SCR MAMMO BI INCL CAD: CPT | Performed by: INTERNAL MEDICINE

## 2022-10-20 PROCEDURE — 77063 BREAST TOMOSYNTHESIS BI: CPT | Performed by: INTERNAL MEDICINE

## 2022-10-23 ENCOUNTER — TELEPHONE (OUTPATIENT)
Dept: INTERNAL MEDICINE CLINIC | Facility: CLINIC | Age: 62
End: 2022-10-23

## 2022-10-23 DIAGNOSIS — K22.89 ESOPHAGEAL THICKENING: Primary | ICD-10-CM

## 2024-05-14 ENCOUNTER — TELEPHONE (OUTPATIENT)
Dept: INTERNAL MEDICINE CLINIC | Facility: CLINIC | Age: 64
End: 2024-05-14

## 2024-05-14 DIAGNOSIS — Z12.31 SCREENING MAMMOGRAM FOR BREAST CANCER: Primary | ICD-10-CM

## 2024-05-14 DIAGNOSIS — Z72.0 TOBACCO ABUSE: ICD-10-CM

## 2024-05-14 DIAGNOSIS — Z87.891 PERSONAL HISTORY OF NICOTINE DEPENDENCE: ICD-10-CM

## 2024-05-14 NOTE — TELEPHONE ENCOUNTER
Patient called to request an Order for a Mammogram and a Lung screen.     Patient advised she was set up in a lung screen program and does it once a year.     Patient states she will be in town 6/3 and will like to come in for a Mammogram and lung scan when she is in town.

## 2024-05-15 NOTE — TELEPHONE ENCOUNTER
Please see request for CT lung order  Mammogram order pended  RECOMMENDATIONS:   ROUTINE MAMMOGRAM AND CLINICAL EVALUATION IN 12 MONTHS.

## 2024-06-03 ENCOUNTER — HOSPITAL ENCOUNTER (OUTPATIENT)
Dept: CT IMAGING | Age: 64
Discharge: HOME | End: 2024-06-03
Attending: INTERNAL MEDICINE
Payer: MEDICARE

## 2024-06-03 DIAGNOSIS — Z72.0 TOBACCO ABUSE: ICD-10-CM

## 2024-06-03 DIAGNOSIS — Z87.891 PERSONAL HISTORY OF NICOTINE DEPENDENCE: ICD-10-CM

## 2024-06-03 PROCEDURE — 71271 CT THORAX LUNG CANCER SCR C-: CPT | Performed by: INTERNAL MEDICINE

## 2024-06-04 ENCOUNTER — TELEPHONE (OUTPATIENT)
Dept: INTERNAL MEDICINE CLINIC | Facility: CLINIC | Age: 64
End: 2024-06-04

## 2024-06-04 ENCOUNTER — LAB ENCOUNTER (OUTPATIENT)
Dept: LAB | Age: 64
End: 2024-06-04
Attending: INTERNAL MEDICINE
Payer: MEDICARE

## 2024-06-04 ENCOUNTER — HOSPITAL ENCOUNTER (OUTPATIENT)
Dept: MAMMOGRAPHY | Age: 64
Discharge: HOME | End: 2024-06-04
Attending: INTERNAL MEDICINE
Payer: MEDICARE

## 2024-06-04 DIAGNOSIS — Z13.29 SCREENING FOR THYROID DISORDER: ICD-10-CM

## 2024-06-04 DIAGNOSIS — I25.10 CORONARY ARTERY CALCIFICATION: Primary | ICD-10-CM

## 2024-06-04 DIAGNOSIS — E55.9 VITAMIN D DEFICIENCY: ICD-10-CM

## 2024-06-04 DIAGNOSIS — E78.00 HYPERCHOLESTEREMIA: ICD-10-CM

## 2024-06-04 DIAGNOSIS — I25.84 CORONARY ARTERY CALCIFICATION: Primary | ICD-10-CM

## 2024-06-04 DIAGNOSIS — I10 HYPERTENSION, UNSPECIFIED TYPE: ICD-10-CM

## 2024-06-04 DIAGNOSIS — Z12.31 SCREENING MAMMOGRAM FOR BREAST CANCER: ICD-10-CM

## 2024-06-04 LAB
ALBUMIN SERPL-MCNC: 4.9 G/DL (ref 3.2–4.8)
ALBUMIN/GLOB SERPL: 1.6 {RATIO} (ref 1–2)
ALP LIVER SERPL-CCNC: 73 U/L
ALT SERPL-CCNC: 16 U/L
ANION GAP SERPL CALC-SCNC: 6 MMOL/L (ref 0–18)
AST SERPL-CCNC: 24 U/L (ref ?–34)
BASOPHILS # BLD AUTO: 0.07 X10(3) UL (ref 0–0.2)
BASOPHILS NFR BLD AUTO: 0.8 %
BILIRUB SERPL-MCNC: 0.6 MG/DL (ref 0.2–1.1)
BUN BLD-MCNC: 6 MG/DL (ref 9–23)
BUN/CREAT SERPL: 10.7 (ref 10–20)
CALCIUM BLD-MCNC: 9.5 MG/DL (ref 8.7–10.4)
CHLORIDE SERPL-SCNC: 99 MMOL/L (ref 98–112)
CHOLEST SERPL-MCNC: 241 MG/DL (ref ?–200)
CO2 SERPL-SCNC: 28 MMOL/L (ref 21–32)
CREAT BLD-MCNC: 0.56 MG/DL
DEPRECATED RDW RBC AUTO: 46.4 FL (ref 35.1–46.3)
EGFRCR SERPLBLD CKD-EPI 2021: 101 ML/MIN/1.73M2 (ref 60–?)
EOSINOPHIL # BLD AUTO: 0.12 X10(3) UL (ref 0–0.7)
EOSINOPHIL NFR BLD AUTO: 1.4 %
ERYTHROCYTE [DISTWIDTH] IN BLOOD BY AUTOMATED COUNT: 14.6 % (ref 11–15)
FASTING PATIENT LIPID ANSWER: YES
FASTING STATUS PATIENT QL REPORTED: YES
GLOBULIN PLAS-MCNC: 3 G/DL (ref 2–3.5)
GLUCOSE BLD-MCNC: 100 MG/DL (ref 70–99)
HCT VFR BLD AUTO: 41.6 %
HDLC SERPL-MCNC: 75 MG/DL (ref 40–59)
HGB BLD-MCNC: 15.7 G/DL
IMM GRANULOCYTES # BLD AUTO: 0.07 X10(3) UL (ref 0–1)
IMM GRANULOCYTES NFR BLD: 0.8 %
LDLC SERPL CALC-MCNC: 147 MG/DL (ref ?–100)
LYMPHOCYTES # BLD AUTO: 1.55 X10(3) UL (ref 1–4)
LYMPHOCYTES NFR BLD AUTO: 18.2 %
MCH RBC QN AUTO: 37.2 PG (ref 26–34)
MCHC RBC AUTO-ENTMCNC: 35.7 G/DL (ref 31–37)
MCV RBC AUTO: 98.6 FL
MONOCYTES # BLD AUTO: 0.98 X10(3) UL (ref 0.1–1)
MONOCYTES NFR BLD AUTO: 11.5 %
MORPHOLOGY: NORMAL
NEUTROPHILS # BLD AUTO: 5.72 X10 (3) UL (ref 1.5–7.7)
NEUTROPHILS # BLD AUTO: 5.72 X10(3) UL (ref 1.5–7.7)
NEUTROPHILS NFR BLD AUTO: 67.3 %
NONHDLC SERPL-MCNC: 166 MG/DL (ref ?–130)
OSMOLALITY SERPL CALC.SUM OF ELEC: 274 MOSM/KG (ref 275–295)
PLATELET # BLD AUTO: 266 10(3)UL (ref 150–450)
PLATELET MORPHOLOGY: NORMAL
POTASSIUM SERPL-SCNC: 3.9 MMOL/L (ref 3.5–5.1)
PROT SERPL-MCNC: 7.9 G/DL (ref 5.7–8.2)
RBC # BLD AUTO: 4.22 X10(6)UL
SODIUM SERPL-SCNC: 133 MMOL/L (ref 136–145)
TRIGL SERPL-MCNC: 112 MG/DL (ref 30–149)
TSI SER-ACNC: 2.27 MIU/ML (ref 0.55–4.78)
VIT D+METAB SERPL-MCNC: 7 NG/ML (ref 30–100)
VLDLC SERPL CALC-MCNC: 21 MG/DL (ref 0–30)
WBC # BLD AUTO: 8.5 X10(3) UL (ref 4–11)

## 2024-06-04 PROCEDURE — 36415 COLL VENOUS BLD VENIPUNCTURE: CPT

## 2024-06-04 PROCEDURE — 85025 COMPLETE CBC W/AUTO DIFF WBC: CPT

## 2024-06-04 PROCEDURE — 84443 ASSAY THYROID STIM HORMONE: CPT

## 2024-06-04 PROCEDURE — 77067 SCR MAMMO BI INCL CAD: CPT | Performed by: INTERNAL MEDICINE

## 2024-06-04 PROCEDURE — 82306 VITAMIN D 25 HYDROXY: CPT

## 2024-06-04 PROCEDURE — 80061 LIPID PANEL: CPT

## 2024-06-04 PROCEDURE — 80053 COMPREHEN METABOLIC PANEL: CPT

## 2024-06-04 PROCEDURE — 77063 BREAST TOMOSYNTHESIS BI: CPT | Performed by: INTERNAL MEDICINE

## 2025-08-08 ENCOUNTER — TELEPHONE (OUTPATIENT)
Dept: INTERNAL MEDICINE CLINIC | Facility: CLINIC | Age: 65
End: 2025-08-08

## (undated) NOTE — LETTER
[facility], [address] [address],[address]  [address] [facility], [address]       11/27/18        Patient: Saad Crowley   YOB: 1958   Date of Visit: 11/27/2018       Dear  Dr. Litzy Chávez MD,      Thank you for referring Saad Crowley to kirstie

## (undated) NOTE — LETTER
12/31/2018              Tal Aj         [address] [address]         Dear Ruth Seay records indicate that the tests ordered for you by Jesusita Homans, MD  have not been done.   If you have, in fact, already completed the t

## (undated) NOTE — LETTER
8/14/2019              Michael Swan         [address] [address]         Dear Nikita Vuong records indicate that the test, Nuclear Stress Test, ordered for you by Cassandra Mcghee MD  have not been done.   If you have, in fact, al

## (undated) NOTE — LETTER
7/11/2019              Sulma Fleming         [address] [address]         Dear Jose Thompson records indicate that the 7-8am blood tests ordered for you by Sandy Terrell MD  have not been done.   If you have, in fact, already comp

## (undated) NOTE — Clinical Note
Thank you for the consult. I saw MsSmith Dinah Zuniga the endocrine/diabetes clinic today. Please see attached my note. Please feel free to contact me with any questions. Thanks!

## (undated) NOTE — LETTER
December 3, 2019    Alexia Hays MD  [address]   [address]     Patient: Odin Mason   YOB: 1958   Date of Visit: 12/3/2019       Dear Dr. Darlene Quintero MD:    Thank you for referring Je Dieter to me for e • Hypertension Father    • Lipids Father         Hyperlipidemia   • Heart Disease Father    • Diabetes Mother    • Heart Disease Mother    • Diabetes Sister    • Glaucoma Neg    • Macular degeneration Neg        Social History: Social History    Tobacco Us Tripelennamine              Comment:Other reaction(s): TRIPELENNAMINE HCL  Triprolidine Hcl            Comment:Other reaction(s): TRIPROLIDINE HCL  Radiology Contrast *    ITCHING    ROS:     ROS     Positive for: Eyes    Negative for: Constitutional, Paul on the eyelids, patient should gently rub the eyelashes and then rinse thoroughly with warm water. Papilloma of eyelid, left  No treatment. Incidental finding on exam.     Tearing eyes  No cause found for eye tearing.   Reassured patient that there i

## (undated) NOTE — LETTER
09/28/21        Amaya A [address]      Dear Jenaro Morris,    Our records indicate that you have outstanding lab work and or testing that was ordered for you and has not yet been completed:  Orders Placed This Encounter      Ga

## (undated) NOTE — LETTER
Nissa Dub [address]   Date:   6/4/2019     Name:   Kendra Brito    YOB: 1958   MRN:   [id number]       WHERE IS YOUR PAIN NOW? Maxwell the areas on your body where you feel the described sensations.   Use the appropriate symbol